# Patient Record
Sex: MALE | Race: WHITE | NOT HISPANIC OR LATINO | Employment: FULL TIME | URBAN - METROPOLITAN AREA
[De-identification: names, ages, dates, MRNs, and addresses within clinical notes are randomized per-mention and may not be internally consistent; named-entity substitution may affect disease eponyms.]

---

## 2017-11-21 ENCOUNTER — HOSPITAL ENCOUNTER (OUTPATIENT)
Facility: HOSPITAL | Age: 57
Setting detail: OBSERVATION
Discharge: HOME | End: 2017-11-22
Attending: EMERGENCY MEDICINE | Admitting: INTERNAL MEDICINE
Payer: COMMERCIAL

## 2017-11-21 ENCOUNTER — APPOINTMENT (EMERGENCY)
Dept: CT IMAGING | Facility: HOSPITAL | Age: 57
End: 2017-11-21
Payer: COMMERCIAL

## 2017-11-21 ENCOUNTER — APPOINTMENT (EMERGENCY)
Dept: RADIOLOGY | Facility: HOSPITAL | Age: 57
End: 2017-11-21
Payer: COMMERCIAL

## 2017-11-21 DIAGNOSIS — R07.9 CHEST PAIN, UNSPECIFIED TYPE: Primary | ICD-10-CM

## 2017-11-21 DIAGNOSIS — R03.0 ELEVATED BLOOD PRESSURE READING: ICD-10-CM

## 2017-11-21 DIAGNOSIS — I71.2 THORACIC AORTIC ANEURYSM WITHOUT RUPTURE (HCC): ICD-10-CM

## 2017-11-21 PROBLEM — R00.1 BRADYCARDIA: Status: ACTIVE | Noted: 2017-11-21

## 2017-11-21 PROBLEM — I72.9 ANEURYSM (HCC): Status: ACTIVE | Noted: 2017-11-21

## 2017-11-21 LAB
ALBUMIN SERPL BCP-MCNC: 3.9 G/DL (ref 3.5–5)
ALP SERPL-CCNC: 50 U/L (ref 46–116)
ALT SERPL W P-5'-P-CCNC: 39 U/L (ref 12–78)
ANION GAP SERPL CALCULATED.3IONS-SCNC: 7 MMOL/L (ref 4–13)
AST SERPL W P-5'-P-CCNC: 27 U/L (ref 5–45)
BASOPHILS # BLD AUTO: 0.05 THOUSANDS/ΜL (ref 0–0.1)
BASOPHILS NFR BLD AUTO: 1 % (ref 0–1)
BILIRUB SERPL-MCNC: 0.3 MG/DL (ref 0.2–1)
BUN SERPL-MCNC: 23 MG/DL (ref 5–25)
CALCIUM SERPL-MCNC: 9.3 MG/DL (ref 8.3–10.1)
CHLORIDE SERPL-SCNC: 103 MMOL/L (ref 100–108)
CO2 SERPL-SCNC: 31 MMOL/L (ref 21–32)
CREAT SERPL-MCNC: 0.96 MG/DL (ref 0.6–1.3)
EOSINOPHIL # BLD AUTO: 0.32 THOUSAND/ΜL (ref 0–0.61)
EOSINOPHIL NFR BLD AUTO: 6 % (ref 0–6)
ERYTHROCYTE [DISTWIDTH] IN BLOOD BY AUTOMATED COUNT: 12.7 % (ref 11.6–15.1)
GFR SERPL CREATININE-BSD FRML MDRD: 88 ML/MIN/1.73SQ M
GLUCOSE SERPL-MCNC: 101 MG/DL (ref 65–140)
HCT VFR BLD AUTO: 42.6 % (ref 36.5–49.3)
HGB BLD-MCNC: 14.2 G/DL (ref 12–17)
LYMPHOCYTES # BLD AUTO: 2.05 THOUSANDS/ΜL (ref 0.6–4.47)
LYMPHOCYTES NFR BLD AUTO: 36 % (ref 14–44)
MAGNESIUM SERPL-MCNC: 2.1 MG/DL (ref 1.6–2.6)
MCH RBC QN AUTO: 31.1 PG (ref 26.8–34.3)
MCHC RBC AUTO-ENTMCNC: 33.3 G/DL (ref 31.4–37.4)
MCV RBC AUTO: 93 FL (ref 82–98)
MONOCYTES # BLD AUTO: 0.46 THOUSAND/ΜL (ref 0.17–1.22)
MONOCYTES NFR BLD AUTO: 8 % (ref 4–12)
NEUTROPHILS # BLD AUTO: 2.77 THOUSANDS/ΜL (ref 1.85–7.62)
NEUTS SEG NFR BLD AUTO: 49 % (ref 43–75)
NRBC BLD AUTO-RTO: 0 /100 WBCS
PLATELET # BLD AUTO: 181 THOUSANDS/UL (ref 149–390)
PMV BLD AUTO: 9.4 FL (ref 8.9–12.7)
POTASSIUM SERPL-SCNC: 4.3 MMOL/L (ref 3.5–5.3)
PROT SERPL-MCNC: 7.3 G/DL (ref 6.4–8.2)
RBC # BLD AUTO: 4.57 MILLION/UL (ref 3.88–5.62)
SODIUM SERPL-SCNC: 141 MMOL/L (ref 136–145)
TROPONIN I SERPL-MCNC: <0.02 NG/ML
TROPONIN I SERPL-MCNC: <0.02 NG/ML
TSH SERPL DL<=0.05 MIU/L-ACNC: 2.72 UIU/ML (ref 0.36–3.74)
WBC # BLD AUTO: 5.66 THOUSAND/UL (ref 4.31–10.16)

## 2017-11-21 PROCEDURE — 71275 CT ANGIOGRAPHY CHEST: CPT

## 2017-11-21 PROCEDURE — 93005 ELECTROCARDIOGRAM TRACING: CPT | Performed by: EMERGENCY MEDICINE

## 2017-11-21 PROCEDURE — 80053 COMPREHEN METABOLIC PANEL: CPT | Performed by: EMERGENCY MEDICINE

## 2017-11-21 PROCEDURE — 84443 ASSAY THYROID STIM HORMONE: CPT | Performed by: NURSE PRACTITIONER

## 2017-11-21 PROCEDURE — 83735 ASSAY OF MAGNESIUM: CPT | Performed by: EMERGENCY MEDICINE

## 2017-11-21 PROCEDURE — 74175 CTA ABDOMEN W/CONTRAST: CPT

## 2017-11-21 PROCEDURE — 84484 ASSAY OF TROPONIN QUANT: CPT | Performed by: EMERGENCY MEDICINE

## 2017-11-21 PROCEDURE — 36415 COLL VENOUS BLD VENIPUNCTURE: CPT | Performed by: EMERGENCY MEDICINE

## 2017-11-21 PROCEDURE — 85025 COMPLETE CBC W/AUTO DIFF WBC: CPT | Performed by: EMERGENCY MEDICINE

## 2017-11-21 PROCEDURE — 71020 HB CHEST X-RAY 2VW FRONTAL&LATL: CPT

## 2017-11-21 RX ORDER — ASPIRIN 81 MG/1
324 TABLET, CHEWABLE ORAL ONCE
Status: COMPLETED | OUTPATIENT
Start: 2017-11-21 | End: 2017-11-21

## 2017-11-21 RX ORDER — 0.9 % SODIUM CHLORIDE 0.9 %
3 VIAL (ML) INJECTION AS NEEDED
Status: DISCONTINUED | OUTPATIENT
Start: 2017-11-21 | End: 2017-11-22 | Stop reason: HOSPADM

## 2017-11-21 RX ADMIN — ASPIRIN 81 MG 324 MG: 81 TABLET ORAL at 16:24

## 2017-11-21 RX ADMIN — IOHEXOL 100 ML: 350 INJECTION, SOLUTION INTRAVENOUS at 19:36

## 2017-11-21 NOTE — ED PROVIDER NOTES
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   Had sudden onset of chest pressure and heaviness to the substernal left chest radiating into his left shoulder and "achiness throughout his left arm and in his back but not radiating into his back "  He states he broke out into a diffuse sweat  Patient states that the school nurse came in and told him he looked "pale and clammy"  Patient at the time did have vital signs checked by the school nurse and was noted to be hypertensive  Patient's blood pressure per patient was 140/100  Patient did not take any medication and came to the ER  He states that he has had this several times and was last evaluated several years ago with a stress test   Patient does have a family history where his father had an early MI          History provided by:  Patient   used: No    Chest Pain   Pain location:  Substernal area and L chest  Pain quality: aching, dull, pressure and radiating    Pain radiates to:  L shoulder  Pain radiates to the back: no    Pain severity:  Moderate  Onset quality:  Gradual  Timing:  Constant  Progression:  Partially resolved  Chronicity:  New  Context: at rest    Context: not breathing, no drug use, not lifting, no movement, not raising an arm, no stress and no trauma    Relieved by:  None tried  Worsened by:  Nothing tried  Ineffective treatments:  None tried  Associated symptoms: diaphoresis, heartburn and near-syncope    Associated symptoms: no abdominal pain, no AICD problem, no anxiety, no claudication, no cough, no dizziness, no fever, no lower extremity edema, no nausea, no numbness, no palpitations, no shortness of breath, no syncope, not vomiting and no weakness    Risk factors: male sex    Risk factors: no aortic disease, no coronary artery disease, no diabetes mellitus, no high cholesterol, no hypertension, no prior DVT/PE and no smoking        None       History reviewed  No pertinent past medical history  Past Surgical History:   Procedure Laterality Date    APPENDECTOMY      BACK SURGERY         History reviewed  No pertinent family history  I have reviewed and agree with the history as documented  Social History   Substance Use Topics    Smoking status: Former Smoker    Smokeless tobacco: Never Used    Alcohol use No        Review of Systems   Constitutional: Positive for diaphoresis  Negative for chills and fever  Respiratory: Negative for cough and shortness of breath  Cardiovascular: Positive for chest pain and near-syncope  Negative for palpitations, claudication, leg swelling and syncope  Gastrointestinal: Positive for heartburn  Negative for abdominal pain, nausea and vomiting  Neurological: Negative for dizziness, weakness and numbness  Physical Exam  ED Triage Vitals [11/21/17 1559]   Temperature Pulse Respirations Blood Pressure SpO2   98 3 °F (36 8 °C) 55 17 161/93 96 %      Temp Source Heart Rate Source Patient Position - Orthostatic VS BP Location FiO2 (%)   Oral Monitor Sitting Right arm --      Pain Score       2           Orthostatic Vital Signs  Vitals:    11/21/17 1559 11/21/17 1625 11/21/17 1830 11/21/17 1900   BP: 161/93 (!) 178/79  150/77   Pulse: 55  64 (!) 49   Patient Position - Orthostatic VS: Sitting Sitting         Physical Exam   Constitutional: He is oriented to person, place, and time  He appears well-developed and well-nourished  No distress  HENT:   Head: Normocephalic and atraumatic     Mouth/Throat: Oropharynx is clear and moist    Eyes: Conjunctivae and EOM are normal  Pupils are equal, round, and reactive to light  Neck: Normal range of motion  Neck supple  Cardiovascular: Regular rhythm, normal heart sounds and intact distal pulses  Bradycardia present  No murmur heard  Pulmonary/Chest: Effort normal and breath sounds normal  No stridor  No respiratory distress  Abdominal: Soft  Bowel sounds are normal  He exhibits no distension  There is no tenderness  Musculoskeletal: Normal range of motion  He exhibits no edema  Neurological: He is alert and oriented to person, place, and time  No cranial nerve deficit  Skin: Skin is warm  Capillary refill takes less than 2 seconds  He is not diaphoretic  Nursing note and vitals reviewed  ED Medications  Medications   sodium chloride (PF) 0 9 % injection 3 mL (not administered)   aspirin chewable tablet 324 mg (324 mg Oral Given 11/21/17 1624)   iohexol (OMNIPAQUE) 350 MG/ML injection (MULTI-DOSE) 100 mL (100 mL Intravenous Given 11/21/17 1936)       Diagnostic Studies  Results Reviewed     Procedure Component Value Units Date/Time    Troponin I [[id number]]  (Normal) Collected:  11/21/17 1942    Lab Status:  Final result Specimen:  Blood from Arm, Right Updated:  11/21/17 2008     Troponin I <0 02 ng/mL     Narrative:         [employer] Chemistry analyzer 99% cutoff is > 0 04 ng/mL in network labs    o cTnI 99% cutoff is useful only when applied to patients in the clinical setting of myocardial ischemia  o cTnI 99% cutoff should be interpreted in the context of clinical history, ECG findings and possibly cardiac imaging to establish correct diagnosis  o cTnI 99% cutoff may be suggestive but clearly not indicative of a coronary event without the clinical setting of myocardial ischemia      Troponin I [[id number]]  (Normal) Collected:  11/21/17 1631    Lab Status:  Final result Specimen:  Blood from Arm, Right Updated:  11/21/17 1657     Troponin I <0 02 ng/mL     Narrative:         [employer] Chemistry analyzer 99% cutoff is > 0 04 ng/mL in network labs    o cTnI 99% cutoff is useful only when applied to patients in the clinical setting of myocardial ischemia  o cTnI 99% cutoff should be interpreted in the context of clinical history, ECG findings and possibly cardiac imaging to establish correct diagnosis  o cTnI 99% cutoff may be suggestive but clearly not indicative of a coronary event without the clinical setting of myocardial ischemia  Comprehensive metabolic panel [[id number]] Collected:  11/21/17 1631    Lab Status:  Final result Specimen:  Blood from Arm, Right Updated:  11/21/17 1654     Sodium 141 mmol/L      Potassium 4 3 mmol/L      Chloride 103 mmol/L      CO2 31 mmol/L      Anion Gap 7 mmol/L      BUN 23 mg/dL      Creatinine 0 96 mg/dL      Glucose 101 mg/dL      Calcium 9 3 mg/dL      AST 27 U/L      ALT 39 U/L      Alkaline Phosphatase 50 U/L      Total Protein 7 3 g/dL      Albumin 3 9 g/dL      Total Bilirubin 0 30 mg/dL      eGFR 88 ml/min/1 73sq m     Narrative:         National Kidney Disease Education Program recommendations are as follows:  GFR calculation is accurate only with a steady state creatinine  Chronic Kidney disease less than 60 ml/min/1 73 sq  meters  Kidney failure less than 15 ml/min/1 73 sq  meters      Magnesium [[id number]]  (Normal) Collected:  11/21/17 1631    Lab Status:  Final result Specimen:  Blood from Arm, Right Updated:  11/21/17 1654     Magnesium 2 1 mg/dL     CBC and differential [[id number]]  (Normal) Collected:  11/21/17 1631    Lab Status:  Final result Specimen:  Blood from Arm, Right Updated:  11/21/17 1638     WBC 5 66 Thousand/uL      RBC 4 57 Million/uL      Hemoglobin 14 2 g/dL      Hematocrit 42 6 %      MCV 93 fL      MCH 31 1 pg      MCHC 33 3 g/dL      RDW 12 7 %      MPV 9 4 fL      Platelets 264 Thousands/uL      nRBC 0 /100 WBCs      Neutrophils Relative 49 %      Lymphocytes Relative 36 %      Monocytes Relative 8 %      Eosinophils Relative 6 %      Basophils Relative 1 %      Neutrophils Absolute 2 77 Thousands/µL      Lymphocytes Absolute 2 05 Thousands/µL      Monocytes Absolute 0 46 Thousand/µL      Eosinophils Absolute 0 32 Thousand/µL      Basophils Absolute 0 05 Thousands/µL                  CTA dissection protocol chest and abdomen   Final Result by Saúl Bermudez MD (11/21 1949)      4 2 cm ascending thoracic aortic aneurysm  No evidence of an aortic dissection  No infiltrate or pleural effusion  No acute intra-abdominal abnormality  No free air or free fluid  Workstation performed: [id number]         X-ray chest 2 views   Final Result by Mary Garcia MD (11/21 1648)      No active pulmonary disease  Workstation performed: [id number]                    Procedures  Procedures       Phone Contacts  ED Phone Contact    ED Course  ED Course as of Nov 21 2021   Tue Nov 21, 2017   1712 Long discussion with patient and wife regarding heart score and risk stratification  He is aware of risks of not staying for complete cardiac workup  Offered patient admission  However patient wishes to go home  He is willing for a 2 troponin rule out  36 Pt is bradycardic and asymptomatic  Patient states that he does run several miles every day  He is aware of a slow heart rate  1859 Patient remained chest pain free  It is noted his blood pressure remains elevated  He states that this is not his normal  Patient did have mild interscapular discomfort on initial exam   We will CT rule out aortic pathology    Patient is aware of this    1947 EKG INTERPRETATION  RHYTHM:  Normal sinus rhythm at 60 beats per minute  AXIS:  Normal axis  INTERVALS:  1st degree AV block  QRS COMPLEX:  Within normal limits  ST SEGMENT:  Nonspecific ST segment changes  QT INTERVAL:  QTC measured at 428 millisecond  COMPARED WITH PRIOR compared to EKG today new first-degree AV block  Interpretation by Jacqui Steele DO      Pending 2nd troponin as well as CAT scan results  2012 CT scan reveals    2012 CT scan reveals ascending aortic aneurysm without dissection  In discussion with patient and wife regarding this  Patient with persistent elevated blood pressures here in the ER and is not on any medication  Patient states that he has had episodes of intermittent tearing chest pain in the past   Patient remains chest pain-free at this time  He is agreeable for admission            HEART Risk Score    Flowsheet Row Most Recent Value   History  1 Filed at: 11/21/2017 1711   ECG  1 Filed at: 11/21/2017 1711   Age  1 Filed at: 11/21/2017 1711   Risk Factors  1 Filed at: 11/21/2017 1711   Troponin  0 Filed at: 11/21/2017 1711   Heart Score Risk Calculator   History  1 Filed at: 11/21/2017 1711   ECG  1 Filed at: 11/21/2017 1711   Age  1 Filed at: 11/21/2017 1711   Risk Factors  1 Filed at: 11/21/2017 1711   Troponin  0 Filed at: 11/21/2017 1711   HEART Score  4 Filed at: 11/21/2017 1711   HEART Score  4 Filed at: 11/21/2017 1711                    CLAUDIA Risk Score    Flowsheet Row Most Recent Value   Age >= 72  0 Filed at: 11/21/2017 1705   Known CAD (stenosis >= 50%)  0 Filed at: 11/21/2017 1705   Recent (<=24 hrs) Service Angina  1 Filed at: 11/21/2017 1705   ST Deviation >= 0 5 mm  0 Filed at: 11/21/2017 1705   3+ CAD Risk Factors (FHx, HTN, HLP, DM, Smoker)  0 Filed at: 11/21/2017 1705   Aspirin Use Past 7 Days  0 Filed at: 11/21/2017 1705   Elevated Cardiac Markers  0 Filed at: 11/21/2017 1705   CLAUDIA Risk Score (Calculated)  1 Filed at: 11/21/2017 1705        Wells' Criteria for PE    Flowsheet Row Most Recent Value   Wells' Criteria for PE   Clinical signs and symptoms of DVT  0 Filed at: 11/21/2017 1705   PE is primary diagnosis or equally likely  0 Filed at: 11/21/2017 1705   HR >100  0 Filed at: 11/21/2017 1705   Immobilization at least 3 days or Surgery in the previous 4 weeks  0 Filed at: 11/21/2017 1705   Previous, objectively diagnosed PE or DVT  0 Filed at: 11/21/2017 1705   Hemoptysis  0 Filed at: 11/21/2017 1705   Malignancy with treatment within 6 months or palliative  0 Filed at: 11/21/2017 1705   Wells' Criteria Total  0 Filed at: 11/21/2017 1705            [facility]  Number of Diagnoses or Management Options  Diagnosis management comments: EKG INTERPRETATION 1601  RHYTHM: 50's   AXIS:  Normal  INTERVALS:  Within normal limits  QRS COMPLEX:  Within normal limits  ST SEGMENT:  Nonspecific ST segment changes  QT INTERVAL:  QTC measured at 395 milliseconds  COMPARED WITH PRIOR compared to May of 2012 no significant change  Interpretation by Rubens Wagner DO         Amount and/or Complexity of Data Reviewed  Clinical lab tests: ordered  Tests in the radiology section of CPT®: ordered  Tests in the medicine section of CPT®: ordered      CritCare Time    Disposition  Final diagnoses:   Chest pain, unspecified type   Thoracic aortic aneurysm without rupture ([facility] [address] )   Elevated blood pressure reading     Time reflects when diagnosis was documented in both MDM as applicable and the Disposition within this note     Time User Action Codes Description Comment    11/21/2017  4:15 PM Bendock, Teofilo Abu L Add [R07 9] Chest pain, unspecified type     11/21/2017  8:18 PM Bendock, Teofilo Abu L Add [I71 2] Thoracic aortic aneurysm without rupture ([facility] [address] )     11/21/2017  8:21 PM Bendock, Teofilo Abu L Add [R03 0] Elevated blood pressure reading       ED Disposition     ED Disposition Condition Comment    Admit  Case was discussed with Todd Cuello and the patient's admission status was agreed to be Admission Status: observation status to the service of Dr Fisher Board          Follow-up Information    None       Patient's Medications    No medications on file     No discharge procedures on file      ED Provider  Electronically Signed by           Mayra Patiño DO  11/21/17 0375

## 2017-11-21 NOTE — Clinical Note
Case was discussed with Johnathon Vazquez and the patient's admission status was agreed to be Admission Status: observation status to the service of Dr Deann Burrell

## 2017-11-22 ENCOUNTER — APPOINTMENT (OUTPATIENT)
Dept: NUCLEAR MEDICINE | Facility: HOSPITAL | Age: 57
End: 2017-11-22
Payer: COMMERCIAL

## 2017-11-22 ENCOUNTER — GENERIC CONVERSION - ENCOUNTER (OUTPATIENT)
Dept: OTHER | Facility: OTHER | Age: 57
End: 2017-11-22

## 2017-11-22 ENCOUNTER — APPOINTMENT (OUTPATIENT)
Dept: NON INVASIVE DIAGNOSTICS | Facility: HOSPITAL | Age: 57
End: 2017-11-22
Payer: COMMERCIAL

## 2017-11-22 VITALS
HEART RATE: 56 BPM | OXYGEN SATURATION: 99 % | WEIGHT: 191.58 LBS | DIASTOLIC BLOOD PRESSURE: 77 MMHG | RESPIRATION RATE: 16 BRPM | SYSTOLIC BLOOD PRESSURE: 131 MMHG | TEMPERATURE: 97.6 F

## 2017-11-22 PROBLEM — I71.2 ANEURYSM OF ASCENDING AORTA (HCC): Status: ACTIVE | Noted: 2017-11-21

## 2017-11-22 LAB
ANION GAP SERPL CALCULATED.3IONS-SCNC: 8 MMOL/L (ref 4–13)
ATRIAL RATE: 56 BPM
ATRIAL RATE: 60 BPM
BUN SERPL-MCNC: 18 MG/DL (ref 5–25)
CALCIUM SERPL-MCNC: 9 MG/DL (ref 8.3–10.1)
CHLORIDE SERPL-SCNC: 105 MMOL/L (ref 100–108)
CHOLEST SERPL-MCNC: 206 MG/DL (ref 50–200)
CO2 SERPL-SCNC: 29 MMOL/L (ref 21–32)
CREAT SERPL-MCNC: 1.02 MG/DL (ref 0.6–1.3)
ERYTHROCYTE [DISTWIDTH] IN BLOOD BY AUTOMATED COUNT: 12.9 % (ref 11.6–15.1)
EST. AVERAGE GLUCOSE BLD GHB EST-MCNC: 111 MG/DL
GFR SERPL CREATININE-BSD FRML MDRD: 82 ML/MIN/1.73SQ M
GLUCOSE SERPL-MCNC: 95 MG/DL (ref 65–140)
HBA1C MFR BLD: 5.5 % (ref 4.2–6.3)
HCT VFR BLD AUTO: 43.2 % (ref 36.5–49.3)
HDLC SERPL-MCNC: 42 MG/DL (ref 40–60)
HGB BLD-MCNC: 14.3 G/DL (ref 12–17)
LDLC SERPL CALC-MCNC: 138 MG/DL (ref 0–100)
MAGNESIUM SERPL-MCNC: 2 MG/DL (ref 1.6–2.6)
MCH RBC QN AUTO: 31 PG (ref 26.8–34.3)
MCHC RBC AUTO-ENTMCNC: 33.1 G/DL (ref 31.4–37.4)
MCV RBC AUTO: 94 FL (ref 82–98)
P AXIS: 60 DEGREES
P AXIS: 62 DEGREES
PLATELET # BLD AUTO: 170 THOUSANDS/UL (ref 149–390)
PLATELET # BLD AUTO: 177 THOUSANDS/UL (ref 149–390)
PMV BLD AUTO: 9.4 FL (ref 8.9–12.7)
PMV BLD AUTO: 9.6 FL (ref 8.9–12.7)
POTASSIUM SERPL-SCNC: 4.5 MMOL/L (ref 3.5–5.3)
PR INTERVAL: 186 MS
PR INTERVAL: 204 MS
QRS AXIS: 71 DEGREES
QRS AXIS: 77 DEGREES
QRSD INTERVAL: 86 MS
QRSD INTERVAL: 86 MS
QT INTERVAL: 410 MS
QT INTERVAL: 428 MS
QTC INTERVAL: 395 MS
QTC INTERVAL: 428 MS
RBC # BLD AUTO: 4.62 MILLION/UL (ref 3.88–5.62)
SODIUM SERPL-SCNC: 142 MMOL/L (ref 136–145)
T WAVE AXIS: 40 DEGREES
T WAVE AXIS: 43 DEGREES
TRIGL SERPL-MCNC: 131 MG/DL
VENTRICULAR RATE: 56 BPM
VENTRICULAR RATE: 60 BPM
WBC # BLD AUTO: 4.99 THOUSAND/UL (ref 4.31–10.16)

## 2017-11-22 PROCEDURE — 93017 CV STRESS TEST TRACING ONLY: CPT

## 2017-11-22 PROCEDURE — 80061 LIPID PANEL: CPT | Performed by: NURSE PRACTITIONER

## 2017-11-22 PROCEDURE — A9502 TC99M TETROFOSMIN: HCPCS

## 2017-11-22 PROCEDURE — 93306 TTE W/DOPPLER COMPLETE: CPT

## 2017-11-22 PROCEDURE — 99285 EMERGENCY DEPT VISIT HI MDM: CPT

## 2017-11-22 PROCEDURE — 78452 HT MUSCLE IMAGE SPECT MULT: CPT

## 2017-11-22 PROCEDURE — 85049 AUTOMATED PLATELET COUNT: CPT | Performed by: NURSE PRACTITIONER

## 2017-11-22 PROCEDURE — 36415 COLL VENOUS BLD VENIPUNCTURE: CPT | Performed by: NURSE PRACTITIONER

## 2017-11-22 PROCEDURE — 83036 HEMOGLOBIN GLYCOSYLATED A1C: CPT | Performed by: FAMILY MEDICINE

## 2017-11-22 PROCEDURE — 80048 BASIC METABOLIC PNL TOTAL CA: CPT | Performed by: NURSE PRACTITIONER

## 2017-11-22 PROCEDURE — 83735 ASSAY OF MAGNESIUM: CPT | Performed by: NURSE PRACTITIONER

## 2017-11-22 PROCEDURE — 85027 COMPLETE CBC AUTOMATED: CPT | Performed by: NURSE PRACTITIONER

## 2017-11-22 RX ORDER — AMLODIPINE BESYLATE 5 MG/1
5 TABLET ORAL DAILY
Qty: 30 TABLET | Refills: 0 | Status: SHIPPED | OUTPATIENT
Start: 2017-11-23 | End: 2017-11-22

## 2017-11-22 RX ORDER — ACETAMINOPHEN 325 MG/1
650 TABLET ORAL EVERY 4 HOURS PRN
Status: DISCONTINUED | OUTPATIENT
Start: 2017-11-22 | End: 2017-11-22

## 2017-11-22 RX ORDER — AMLODIPINE BESYLATE 5 MG/1
5 TABLET ORAL DAILY
Qty: 30 TABLET | Refills: 0 | Status: SHIPPED | OUTPATIENT
Start: 2017-11-23 | End: 2018-03-29 | Stop reason: ALTCHOICE

## 2017-11-22 RX ORDER — ACETAMINOPHEN 325 MG/1
650 TABLET ORAL EVERY 6 HOURS PRN
Status: DISCONTINUED | OUTPATIENT
Start: 2017-11-22 | End: 2017-11-22 | Stop reason: HOSPADM

## 2017-11-22 RX ORDER — ASPIRIN 81 MG/1
81 TABLET, CHEWABLE ORAL DAILY
Qty: 30 TABLET | Refills: 0 | Status: SHIPPED | OUTPATIENT
Start: 2017-11-23

## 2017-11-22 RX ORDER — ATORVASTATIN CALCIUM 40 MG/1
40 TABLET, FILM COATED ORAL EVERY EVENING
Qty: 30 TABLET | Refills: 0 | Status: SHIPPED | OUTPATIENT
Start: 2017-11-22 | End: 2017-11-22

## 2017-11-22 RX ORDER — ASPIRIN 81 MG/1
81 TABLET, CHEWABLE ORAL DAILY
Status: DISCONTINUED | OUTPATIENT
Start: 2017-11-23 | End: 2017-11-22 | Stop reason: HOSPADM

## 2017-11-22 RX ORDER — ATORVASTATIN CALCIUM 40 MG/1
40 TABLET, FILM COATED ORAL EVERY EVENING
Status: DISCONTINUED | OUTPATIENT
Start: 2017-11-22 | End: 2017-11-22 | Stop reason: HOSPADM

## 2017-11-22 RX ORDER — AMLODIPINE BESYLATE 5 MG/1
5 TABLET ORAL DAILY
Status: DISCONTINUED | OUTPATIENT
Start: 2017-11-22 | End: 2017-11-22 | Stop reason: HOSPADM

## 2017-11-22 RX ORDER — ATORVASTATIN CALCIUM 40 MG/1
40 TABLET, FILM COATED ORAL EVERY EVENING
Qty: 30 TABLET | Refills: 0 | Status: SHIPPED | OUTPATIENT
Start: 2017-11-22 | End: 2018-03-29 | Stop reason: ALTCHOICE

## 2017-11-22 RX ORDER — ASPIRIN 81 MG/1
81 TABLET, CHEWABLE ORAL DAILY
Qty: 30 TABLET | Refills: 0 | Status: SHIPPED | OUTPATIENT
Start: 2017-11-23 | End: 2017-11-22

## 2017-11-22 RX ADMIN — AMLODIPINE BESYLATE 5 MG: 5 TABLET ORAL at 09:42

## 2017-11-22 NOTE — CONSULTS
Consult Note - Vascular Surgery   [facility]: [phone]    Assessment:  Chest pain - negative troponins, EKG with out notable abnormalities, with CTA negative for dissection however incidental finding of ascending thoracic aorta with aneurysmal dilation of 4 2 cm  Patient did have elevated blood pressure on admission  Cardiology is following  A stress test showed no chest pain during stress and stress EKG was negative for ischemia  Ejection fraction 55%  Ascending thoracic aortic aneurysm - incidental finding on CT scan and demonstrates aneurysmal dilation of the ascending thoracic aorta measuring 4 2 cm  No evidence of dissection    Plan:  - no need for vascular surgery intervention of ascending thoracic aortic dilation  - we will referred to cardiothoracic surgery for outpatient follow-up  I have contacted CT surgery and they will contact the patient about making appointment  I have also provided the phone number for the patient  This was discussed with the patient and his wife     ______________________________________________________________________    Consulting Service: [facility]    Chief Complaint:  Had sudden sharp chest pain on the left side of my chest that went to my shoulder I also felt sweaty and sick  It scared me    HPI: Bradley Vargas is a 65 y o  male who presents with sudden onset of left-sided chest pain yesterday while at work  Patient is a  in states he has had episodes of chest pain in the past, but this was much more severe and he had not had the associated sweating and radiation into the shoulder in the past   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 and his blood pressure was 140/100  He was also told by the school nurse that he looked pale  Patient states he did has an elevated blood pressure few years ago with a stress test  Patient states he is active in each generally well  He denies any smoking or tobacco use    He does note that his job is stressful and he me have a caffeine problem "  He denies chest pain with physical activity  He states that he thinks his father had congestive heart failure  He states his father was a smoker also  He denies any other known cardiac family history  Patient denies current chest pain, nausea, vomiting, shortness of breath, palpitations, headache, lightheadedness, tingling or numbness in extremities  Review of Systems:  Negative except as noted in HPI     Past Medical History:  Past Medical History:   Diagnosis Date    Bradycardia with 41-50 beats per minute     Hypertension     Thoracic aortic aneurysm without rupture ([facility] [address] )        Past Surgical History:  Past Surgical History:   Procedure Laterality Date    APPENDECTOMY      BACK SURGERY         Social History:  History   Alcohol Use No     History   Drug Use No     History   Smoking Status    Former Smoker   Smokeless Tobacco    Never Used       Family History:  History reviewed  No pertinent family history  Allergies:  No Known Allergies    Medications:  Current Facility-Administered Medications   Medication Dose Route Frequency    acetaminophen (TYLENOL) tablet 650 mg  650 mg Oral Q6H PRN    amLODIPine (NORVASC) tablet 5 mg  5 mg Oral Daily    [START ON 11/23/2017] aspirin chewable tablet 81 mg  81 mg Oral Daily    atorvastatin (LIPITOR) tablet 40 mg  40 mg Oral QPM    enoxaparin (LOVENOX) subcutaneous injection 40 mg  40 mg Subcutaneous Daily    sodium chloride (PF) 0 9 % injection 3 mL  3 mL Intravenous PRN       Vitals:  /77   Pulse 56   Temp 97 6 °F (36 4 °C) (Oral)   Resp 16   Wt 86 9 kg (191 lb 9 3 oz)   SpO2 99%     I/Os:  No intake/output data recorded  No intake/output data recorded      Lab Results and Cultures:   CBC with diff:   Lab Results   Component Value Date    WBC 4 99 11/22/2017    HGB 14 3 11/22/2017    HCT 43 2 11/22/2017    MCV 94 11/22/2017     11/22/2017    MCH 31 0 11/22/2017    MCHC 33 1 11/22/2017    RDW 12 9 11/22/2017    MPV 9 4 11/22/2017    NRBC 0 11/21/2017   ,   BMP/CMP:  Lab Results   Component Value Date     11/22/2017    K 4 5 11/22/2017     11/22/2017    CO2 29 11/22/2017    ANIONGAP 8 11/22/2017    BUN 18 11/22/2017    CREATININE 1 02 11/22/2017    GLUCOSE 95 11/22/2017    CALCIUM 9 0 11/22/2017    AST 27 11/21/2017    ALT 39 11/21/2017    ALKPHOS 50 11/21/2017    PROT 7 3 11/21/2017    ALBUMIN 3 9 11/21/2017    BILITOT 0 30 11/21/2017    EGFR 82 11/22/2017   ,   Lipid Panel:   Lab Results   Component Value Date    CHOL 206 (H) 11/22/2017   ,   Imaging:  X-ray Chest 2 Views    Result Date: 11/21/2017  Impression: No active pulmonary disease  Workstation performed: [id number]     Cta Dissection Protocol Chest And Abdomen    Result Date: 11/21/2017  Impression: 4 2 cm ascending thoracic aortic aneurysm  No evidence of an aortic dissection  No infiltrate or pleural effusion  No acute intra-abdominal abnormality  No free air or free fluid  Workstation performed: [id number]       Physical Exam:    General appearance: alert, appears stated age and cooperative  Head: Normocephalic, without obvious abnormality, atraumatic  Eyes: conjunctivae/corneas clear  PERRL, EOM's intact  Fundi benign    Neck: no adenopathy, no carotid bruit, no JVD and supple, symmetrical, trachea midline   Lungs: clear to auscultation bilaterally  Chest wall: no tenderness  Heart: regular rate and rhythm, S1, S2 normal, no murmur, click, rub or gallop  Abdomen: soft, non-tender; bowel sounds normal; no masses,  no organomegaly  Extremities: extremities normal, atraumatic, no cyanosis or edema  Skin: Skin color, texture, turgor normal  No rashes or lesions  Neurologic: Grossly normal    Pulse exam:  Radial: Right: 2+ Left[de-identified] 2+  Ulnar: Right: 2+ Left[de-identified] 2+  Femoral: Right: 2+ Left: 2+  DP: Right: 2+ Left: 2+  PT: Right: 2+ Left: 2+    SP ChristensenC  11/22/2017

## 2017-11-22 NOTE — CASE MANAGEMENT
[facility] in the [facility] by [address] for 2017  Network Utilization Review Department  Phone: [phone]; Fax [phone]  ATTENTION: The Network Utilization Review Department is now centralized for our 7 Facilities  Make a note that we have a new phone and fax numbers for our Department  Please call with any questions or concerns to [phone] and carefully follow the prompts so that you are directed to the right person  All voicemails are confidential  Fax any determinations, approvals, denials, and requests for initial or continue stay review clinical to [phone]  Due to HIGH CALL volume, it would be easier if you could please send faxed requests to expedite your requests and in part, help us provide discharge notifications faster  Initial Clinical Review    Admission: Date/Time/Statement: 11/21 2021     Orders Placed This Encounter   Procedures    Place in Observation (expected length of stay for this patient is less than two midnights)     Standing Status:   Standing     Number of Occurrences:   1     Order Specific Question:   Admitting Physician     Answer:   Donna William [[id number]]     Order Specific Question:   Level of Care     Answer:   Med Surg [16]         ED: Date/Time/Mode of Arrival:   ED Arrival Information     Expected Arrival Acuity Means of Arrival Escorted By Service Admission Type    - 11/21/2017 15:50 Urgent Walk-In Spouse General Medicine Urgent    Arrival Complaint    CHEST PAIN          Chief Complaint:   Chief Complaint   Patient presents with    Chest Pain     pt c/o chest pain that started at approx 1500 this afternoon  denies any SOB but states "it just feels tight" denies any hx of asthma or lung issues       History of Illness:    Buddy Mckenzie is a 65 y o  male who presents with left chest pain radiating to left shoulder, left arm, left scapular and jaw    Associated with diffuse diaphoresis and lightheadedness  Patient report onset about 1 month ago  With worsening of symptoms today  Patient report highly stressful work environment  He denies shortness of breath, nausea, vomiting, fever chills  ED Vital Signs:   ED Triage Vitals [11/21/17 1559]   Temperature Pulse Respirations Blood Pressure SpO2   98 3 °F (36 8 °C) 55 17 161/93 96 %      Temp Source Heart Rate Source Patient Position - Orthostatic VS BP Location FiO2 (%)   Oral Monitor Sitting Right arm --      Pain Score       2        Wt Readings from Last 1 Encounters:   11/21/17 86 9 kg (191 lb 9 3 oz)       Vital Signs (abnormal): wnl   Abnormal Labs/Diagnostic Test Results: CT chest- wnl   CXR -wnl   ED Treatment:   Medication Administration from 11/21/2017 1550 to 11/22/2017 9190       Date/Time Order Dose Route Action Action by Comments     11/21/2017 1624 aspirin chewable tablet 324 mg 324 mg Oral Given Ewa Mcmillan RN      11/21/2017 1936 iohexol (OMNIPAQUE) 350 MG/ML injection (MULTI-DOSE) 100 mL 100 mL Intravenous Given Phil Mackenzie           Past Medical/Surgical History: Active Ambulatory Problems     Diagnosis Date Noted    No Active Ambulatory Problems     Resolved Ambulatory Problems     Diagnosis Date Noted    No Resolved Ambulatory Problems     Past Medical History:   Diagnosis Date    Bradycardia with 41-50 beats per minute     Hypertension     Thoracic aortic aneurysm without rupture [facility])        Admitting Diagnosis: Chest pain [R07 9]    Age/Sex: 65 y o  male    Assessment/Plan: Hospital Problem List:      Principal Problem:    Chest pain  Active Problems:    Bradycardia    Elevated blood pressure, situational    Aneurysm (HCC)   Plan for the Primary Problem(s):  · Chest pain:  Possible ACS  Persistent, 2/10 pain scale   "like a muscle injury" Symptoms typical   Left chest radiating to left shoulder left arm, jaw and left scapular    CTA reviewed negative for acute PE, note 4 2 cm ascending thoracic aortic aneurysm  No evidence of an aortic dissection  ? EKG and troponin reviewed unremarkable  Will trend  ? Cardiology consult  ? Telemetry  ? Monitor for risk factors, lipid panel and hemoglobin A1c   ? Echo  ? Will initiate Norvasc, low-dose Lipitor, aspirin    Plan for Additional Problems:    · Bradycardia:  Asymptomatic  Pt report hx triathlon, cyclist, runner, athlete related bradycardia  Continue telemetry, cardiology consult  Continue to monitor    Elevated blood pressure likely situational:  Patient denies history of, Will initiate Norvasc 5 mg     Incidental finding:  Aneurysm:  New onset:  Noted from CTA 4 2 cm ascending thoracic aortic aneurysm  No evidence of an aortic dissection  Will consult vascular for further evaluation and management    VTE Prophylaxis: Enoxaparin (Lovenox)  / sequential compression device   Code Status: Full code  POLST: There is no POLST form on file for this patient (pre-hospital)   Anticipated Length of Stay:  Patient will be admitted on an Observation basis with an anticipated length of stay of  less than 2 midnights     Justification for Hospital Stay:  Rule out ACS, Cardiology workup       Admission Orders:  Scheduled Meds:   amLODIPine 5 mg Oral Daily   [START ON 11/23/2017] aspirin 81 mg Oral Daily   atorvastatin 40 mg Oral QPM   enoxaparin 40 mg Subcutaneous Daily     Continuous Infusions:    PRN Meds:   acetaminophen    Insert peripheral IV **AND** sodium chloride (PF)    Cardiology consult   Up and OOB as german   Cardiac diet   Tele   EKG prn cp   Serial trop   11/22 lipid profile , hgb a1c , cbc , bmp, mg   Consult vascular surgery

## 2017-11-22 NOTE — SUBJECTIVE & OBJECTIVE
VTE Pharmacologic Prophylaxis:   Pharmacologic: Enoxaparin (Lovenox)  Mechanical: Mechanical VTE prophylaxis in place  Patient Centered Rounds: I have performed bedside rounds with nursing staff today  Discussions with Specialists or Other Care Team Provider: n/a  Education and Discussions with Family / Patient: patient  Time Spent for Care: 15 minutes  More than 50% of total time spent on counseling and coordination of care as described above  Current Length of Stay: 0 day(s)  Current Patient Status: Observation   Certification Statement: Patient admitted for observation last night for chest pain rule out, will remain in observation pending Cardiology recommendations    Discharge Plan:  Home when cleared by Cardiology  Code Status: Level 1 - Full Code    Subjective:   Overnight no events, troponin trend negative    Objective:   Vitals:   Temp (24hrs), Av °F (36 7 °C), Min:97 6 °F (36 4 °C), Max:98 3 °F (36 8 °C)    HR:  [49-64] 61  Resp:  [16-18] 16  BP: (120-178)/(69-93) 132/76  SpO2:  [96 %-100 %] 100 %  There is no height or weight on file to calculate BMI       Input and Output Summary (last 24 hours):     No intake or output data in the 24 hours ending 17 0831    Physical Exam:     Physical Exam    Additional Data:   Labs:    Results from last 7 days  Lab Units 17  1631   WBC Thousand/uL 4 99 5 66   HEMOGLOBIN g/dL 14 3 14 2   HEMATOCRIT % 43 2 42 6   PLATELETS Thousands/uL 170 181   NEUTROS PCT %  --  49   LYMPHS PCT %  --  36   MONOS PCT %  --  8   EOS PCT %  --  6       Results from last 7 days  Lab Units 17  1631   SODIUM mmol/L 142 141   POTASSIUM mmol/L 4 5 4 3   CHLORIDE mmol/L 105 103   CO2 mmol/L 29 31   BUN mg/dL 18 23   CREATININE mg/dL 1 02 0 96   CALCIUM mg/dL 9 0 9 3   TOTAL PROTEIN g/dL  --  7 3   BILIRUBIN TOTAL mg/dL  --  0 30   ALK PHOS U/L  --  50   ALT U/L  --  39   AST U/L  --  27   GLUCOSE RANDOM mg/dL 95 101           * I Have Reviewed All Lab Data Listed Above  * Additional Pertinent Lab Tests Reviewed: [address] [address] Admission Reviewed    Imaging:    Imaging Reports Reviewed Today Include:  Chest CTA dissection    Cultures:   Blood Culture: No results found for: BLOODCX  Urine Culture: No results found for: URINECX  Sputum Culture: No components found for: SPUTUMCX  Wound Culture: No results found for: WOUNDCULT    Last 24 Hours Medication List:     amLODIPine 5 mg Oral Daily   [START ON 11/23/2017] aspirin 81 mg Oral Daily   atorvastatin 40 mg Oral QPM   enoxaparin 40 mg Subcutaneous Daily        Today, Patient Was Seen By: Zulay Serna MD    ** Please Note: Dragon 360 Dictation voice to text software may have been used in the creation of this document   **

## 2017-11-22 NOTE — CONSULTS
Consultation - Cardiology    Clint Pires 65 y o  male MRN: [id number]  Unit/Bed#: FT 02 Encounter: [id number]    Physician Requesting Consult: Ubaldo Mandujano,*    Reason for Consult / Principal Problem: chest pain    HPI: Clint Pires is a 65y o  year old male who presented to the emergency with complaints of chest pain  Patient states that it started suddenly yesterday while at his desk  He describes sharp pain in which became so severe that he felt short of breath that his chest was tight  Patient admits to associated diaphoresis  He relates that his blood pressure was checked by the school nurse noted it to be 140/100  He denies nausea/vomiting, dizziness  He denies exacerbation of the pain or deep breathing, coughing  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 which he feels may be contributing  Patient admits he has had similar symptoms past however they were not as severe  He denies ever having exertional symptoms he states he is very active often  He states last week he ran 10K without difficulty  He does admit to heavy caffeine intake  Historical Information   Past Medical History:   Diagnosis Date    Bradycardia with 41-50 beats per minute     Hypertension     Thoracic aortic aneurysm without rupture (HCC)      Past Surgical History:   Procedure Laterality Date    APPENDECTOMY      BACK SURGERY       History   Alcohol Use No     History   Drug Use No     History   Smoking Status    Former Smoker   Smokeless Tobacco    Never Used       FAMILY HISTORY:  History reviewed  No pertinent family history      MEDS & ALLERGIES:  all current active meds have been reviewed and current meds:   Current Facility-Administered Medications   Medication Dose Route Frequency    acetaminophen (TYLENOL) tablet 650 mg  650 mg Oral Q6H PRN    amLODIPine (NORVASC) tablet 5 mg  5 mg Oral Daily    [START ON 11/23/2017] aspirin chewable tablet 81 mg  81 mg Oral Daily    atorvastatin (LIPITOR) tablet 40 mg  40 mg Oral QPM    enoxaparin (LOVENOX) subcutaneous injection 40 mg  40 mg Subcutaneous Daily    sodium chloride (PF) 0 9 % injection 3 mL  3 mL Intravenous PRN        No Known Allergies      REVIEW OF SYSTEMS:  Constitutional: Denies fever or chills  Eyes: Denies visual disturbance change in visual acuity   HENT: Denies nasal congestion or sore throat   Respiratory: + shortness of breath chest tightness Denies cough  Cardiovascular: + chest pain, diaphoresis Denies palpitations or lower extremity swelling   GI: Denies abdominal pain, nausea, vomiting, bloody stools or diarrhea   : Denies dysuria, frequency, hematuria  Musculoskeletal: Denies back pain or joint pain   Neurologic: Denies , syncope, headache, focal weakness or sensory changes   Psychiatric: + anxiety      PHYSICAL EXAM:  Vitals:   Vitals:    11/22/17 0753   BP: 132/76   Pulse: 61   Resp: 16   Temp: 97 6 °F (36 4 °C)   SpO2: 100%     There is no height or weight on file to calculate BMI  Systolic (38SVS), ETX:536 , Min:120 , GPB:524     Diastolic (11JRJ), AAX:04, Min:69, Max:93    No intake or output data in the 24 hours ending 11/22/17 0842  Weight (last 2 days)     Date/Time   Weight    11/21/17 1559  86 9 (191 58)            Invasive Devices     Peripheral Intravenous Line            Peripheral IV 11/21/17 Right Antecubital less than 1 day                General: Patient is not in acute distress  Laying comfortably in the bed  Head: Normocephalic  Atraumatic  HEENT: Both pupils normal sized, round and reactive to light  Nonicteric  Neck: JVP not elevated  Trachea central    Respiratory: Bilateral bronchovascular breath sounds with no crackles or rhonchi  Cardiovascular: RRR  S1 and S2 normal  No murmur, rub or gallop  GI: Abdomen soft, nontender  No guarding or rigidity  Neurologic: Patient is awake, alert, responding to commands  Well-oriented to time, place and person   Moving all extremities  Extremities: No clubbing, cyanosis or edema  Integument: No skin rashes or ulceration      LABORATORY RESULTS:    Results from last 7 days  Lab Units 11/21/17  1942 11/21/17  1631   TROPONIN I ng/mL <0 02 <0 02       CBC with diff:   Results from last 7 days  Lab Units 11/22/17  0527 11/21/17  1631   WBC Thousand/uL 4 99 5 66   HEMOGLOBIN g/dL 14 3 14 2   HEMATOCRIT % 43 2 42 6   MCV fL 94 93   PLATELETS Thousands/uL 170 181   MCH pg 31 0 31 1   MCHC g/dL 33 1 33 3   RDW % 12 9 12 7   MPV fL 9 6 9 4   NRBC AUTO /100 WBCs  --  0       CMP:  Results from last 7 days  Lab Units 11/22/17  0527 11/21/17  1631   SODIUM mmol/L 142 141   POTASSIUM mmol/L 4 5 4 3   CHLORIDE mmol/L 105 103   CO2 mmol/L 29 31   ANION GAP mmol/L 8 7   BUN mg/dL 18 23   CREATININE mg/dL 1 02 0 96   GLUCOSE RANDOM mg/dL 95 101   CALCIUM mg/dL 9 0 9 3   AST U/L  --  27   ALT U/L  --  39   ALK PHOS U/L  --  50   TOTAL PROTEIN g/dL  --  7 3   ALBUMIN g/dL  --  3 9   BILIRUBIN TOTAL mg/dL  --  0 30   EGFR ml/min/1 73sq m 82 88       BMP:  Results from last 7 days  Lab Units 11/22/17  0527 11/21/17  1631   SODIUM mmol/L 142 141   POTASSIUM mmol/L 4 5 4 3   CHLORIDE mmol/L 105 103   CO2 mmol/L 29 31   BUN mg/dL 18 23   CREATININE mg/dL 1 02 0 96   GLUCOSE RANDOM mg/dL 95 101   CALCIUM mg/dL 9 0 9 3              Results from last 7 days  Lab Units 11/22/17  0527 11/21/17  1631   MAGNESIUM mg/dL 2 0 2 1           Results from last 7 days  Lab Units 11/21/17  1631   TSH 3RD GENERATON uIU/mL 2 724           Lipid Profile:   Lab Results   Component Value Date    CHOL 206 (H) 11/22/2017     Lab Results   Component Value Date    HDL 42 11/22/2017     Lab Results   Component Value Date    LDLCALC 138 (H) 11/22/2017     Lab Results   Component Value Date    TRIG 131 11/22/2017           Imaging: I have personally reviewed pertinent reports  EKG reviewed personally:  Sinus bradycardia, otherwise normal EKG        Assessment and Plan:  1   Chest pain  -troponin negative, EKG unremarkable  -CTA negative for PE or dissection  -however given patient's recurrent symptoms echocardiogram of structural disease as well stress test evaluate ischemic etiology of symptoms    2  Sinus bradycardia-patient asymptomatic  Likely secondary to patient being well conditioned  3   Thoracic aortic aneurysm- 4 2 cm on CT  Would recommend beta-blocker, however baseline bradycardia precludes use at this time  4   Elevated blood pressure-patient does not have history of hypertension  Patient was placed on amlodipine this visit  Will continue to monitor    5  Hyperlipidemia-, total cholesterol 206  Patient was placed on atorvastatin    Code Status: Level 1 - Full Code      Thank you for allowing us to participate in this patient's care  This pt will follow up with Dr Sahra De Anda once discharged  Counseling / Coordination of Care  Total floor / unit time spent today 35 minutes  Greater than 50% of total time was spent with the patient and / or family counseling and / or coordination of care  A description of the counseling / coordination of care: Review of history, current assessment, development of a plan         Shabnam Rodriguez PA-C  11/22/2017,8:42 AM

## 2017-11-22 NOTE — DISCHARGE SUMMARY
Discharge Summary - [facility]    Patient Information: Sudhakar Poon 65 y o  male MRN: [id number]  Unit/Bed#: FT 02 Encounter: [id number]    Discharging Physician / Practitioner: Clovis Canales MD  PCP: No primary care provider on file  Admission Date: 11/21/2017  Discharge Date: 11/22/17    Reason for Admission:  Chest pain    Discharge Diagnoses:     Principal Problem:    Chest pain  Active Problems:    Bradycardia    Elevated blood pressure reading    Aneurysmal dilatation (HCC)  Resolved Problems:    * No resolved hospital problems  *      Consultations During Hospital Stay:  · Cardiology  · Vascular surgery    Procedures Performed:     · Echocardiogram  · Stress test  · Chest x-ray  · CTA dissection protocol chest and abdomen    Significant Findings:     · A normal stress test, negative for ischemia  · Ejection fraction 60% with mild concentric hypertrophy, trace mitral and tricuspid regurgitation  Aortic root exhibited normal size  · 4 2 cm ascending thoracic aortic aneurysm no dissection  · Normal troponin trend  · Total cholesterol 206, HDL 42,     Incidental Findings:   · Aneurysmal dilation of ascending aorta    Test Results Pending at Discharge (will require follow up): · None     Outpatient Tests Requested:  · n/a    Complications:  None    Hospital Course: Sudhakar Poon is a 65 y o  male patient with no pertinent past medical history who originally presented to the hospital on 11/21/2017 due to sudden onset of chest pain associated with radiation to left arm, diaphoresis and lightheadedness  Patient presented with regular sinus bradycardia, patient is very active physically he does participate in triathlons ,marathons    Patient's blood pressure was noted to be mildly elevated in the ED  Workup revealed incidental findings of ascending thoracic aorta dilation  Patient was admitted to internal medicine service for observation and further workup of his chest pain complaint  Cardiology was consulted  Given patient's elevated blood pressure, chest pain, mild bradycardia, and findings of CTA echocardiogram and stress test were recommended  Patient remained chest pain-free, he was started on aspirin and statin, given his mild bradycardia beta-blocker was not initiated, for blood pressure control especially in light of thoracic aorta dilation patient was started on Norvasc  Patient was also examined by vascular surgery and was recommended outpatient follow-up with cardiothoracic surgery for surveillance, patient has no family history of aortic aneurysm or intracranial aneurysm  Patient is cleared for discharge with prescription for aspirin, Lipitor and Norvasc  He was given follow-up information with Cardiology and cardiothoracic surgery        Condition at Discharge: stable     Discharge Day Visit / Exam:     Subjective:  Patient admitted last night for observation, overnight continues to be intermittently in mild sinus bradycardia, denies any chest pain  Vitals: Blood Pressure: 131/77 (11/22/17 1051)  Pulse: 56 (11/22/17 1051)  Temperature: 97 6 °F (36 4 °C) (11/22/17 0753)  Temp Source: Oral (11/22/17 0753)  Respirations: 16 (11/22/17 1051)  Weight - Scale: 86 9 kg (191 lb 9 3 oz) (11/21/17 1559)  SpO2: 99 % (11/22/17 1051)  Exam:   Physical Exam   Constitutional: He is oriented to person, place, and time  He appears well-developed and well-nourished  No distress  HENT:   Head: Normocephalic and atraumatic  Eyes: Conjunctivae and EOM are normal  Pupils are equal, round, and reactive to light  Neck: Normal range of motion  Neck supple  No JVD present  No thyromegaly present  Cardiovascular: Normal rate and regular rhythm  No murmur heard  No bradycardia during my assessment   Pulmonary/Chest: Effort normal  No respiratory distress  Musculoskeletal: Normal range of motion  Neurological: He is alert and oriented to person, place, and time   No cranial nerve deficit  Coordination normal    Skin: Skin is warm and dry  He is not diaphoretic  Psychiatric: His behavior is normal  Judgment and thought content normal  His mood appears anxious  Nursing note and vitals reviewed  Discharge instructions/Information to patient and family:   See after visit summary for information provided to patient and family  Provisions for Follow-Up Care:  See after visit summary for information related to follow-up care and any pertinent home health orders  Disposition:     Home    For Discharges to [address] [facility]:   · Not Applicable to this Patient - Not Applicable to this Patient    Planned Readmission: no     Discharge Statement:  I spent 40 minutes discharging the patient  This time was spent on the day of discharge  I had direct contact with the patient on the day of discharge  Greater than 50% of the total time was spent examining patient, answering all patient questions, arranging and discussing plan of care with patient as well as directly providing post-discharge instructions  Additional time then spent on discharge activities  Discharge Medications:  See after visit summary for reconciled discharge medications provided to patient and family  ** Please Note: Dragon 360 Dictation voice to text software may have been used in the creation of this document   **

## 2017-11-22 NOTE — H&P
History and Physical - [facility]    Patient Information: Renetta Zamorano 65 y o  male MRN: [id number]  Unit/Bed#: FT 02 Encounter: [id number]  Admitting Physician: OBED [place]  PCP: No primary care provider on file  Date of Admission:  11/21/17    Assessment/Plan:    Hospital Problem List:     Principal Problem:    Chest pain  Active Problems:    Bradycardia    Elevated blood pressure, situational    Aneurysm ([address] )      Plan for the Primary Problem(s):  · Chest pain:  Possible ACS  Persistent, 2/10 pain scale   "like a muscle injury" Symptoms typical   Left chest radiating to left shoulder left arm, jaw and left scapular  CTA reviewed negative for acute PE, note 4 2 cm ascending thoracic aortic aneurysm  No evidence of an aortic dissection  · EKG and troponin reviewed unremarkable  Will trend  · Cardiology consult  · Telemetry  · Monitor for risk factors, lipid panel and hemoglobin A1c   · Echo  · Will initiate Norvasc, low-dose Lipitor, aspirin  Plan for Additional Problems:     · Bradycardia:  Asymptomatic  Pt report hx triathlon, cyclist, runner, athlete related bradycardia  Continue telemetry, cardiology consult  Continue to monitor  · Elevated blood pressure likely situational:  Patient denies history of, Will initiate Norvasc 5 mg  · Incidental finding:  Aneurysm:  New onset:  Noted from CTA 4 2 cm ascending thoracic aortic aneurysm  No evidence of an aortic dissection  Will consult vascular for further evaluation and management  VTE Prophylaxis: Enoxaparin (Lovenox)  / sequential compression device   Code Status: Full code  POLST: There is no POLST form on file for this patient (pre-hospital)    Anticipated Length of Stay:  Patient will be admitted on an Observation basis with an anticipated length of stay of  less than 2 midnights     Justification for Hospital Stay:  Rule out ACS, Cardiology workup    Total Time for Visit, including Counseling / Coordination of Care: 45 minutes  Greater than 50% of this total time spent on direct patient counseling and coordination of care  Chief Complaint:   Chest pain    History of Present Illness:    Sudhakar Poon is a 65 y o  male who presents with left chest pain radiating to left shoulder, left arm, left scapular and jaw  Associated with diffuse diaphoresis and lightheadedness  Patient report onset about 1 month ago  With worsening of symptoms today  Patient report highly stressful work environment  He denies shortness of breath, nausea, vomiting, fever chills  Review of Systems:    Review of Systems   Constitutional: Positive for diaphoresis  Respiratory: Negative for chest tightness and shortness of breath  Cardiovascular: Positive for chest pain  Negative for palpitations  Neurological: Positive for light-headedness  All other systems reviewed and are negative  Past Medical and Surgical History:     History reviewed  No pertinent past medical history  Past Surgical History:   Procedure Laterality Date    APPENDECTOMY      BACK SURGERY         Meds/Allergies:    Prior to Admission medications    Not on File     I have reviewed home medications with patient personally  Allergies: No Known Allergies    Social History:     Marital Status: /Civil Union   Occupation:  Principal  Patient Pre-hospital Living Situation:  At home with family  Patient Pre-hospital Level of Mobility:  Independent  Patient Pre-hospital Diet Restrictions:  None  Substance Use History:   History   Alcohol Use No     History   Smoking Status    Former Smoker   Smokeless Tobacco    Never Used     History   Drug Use No       Family History:    Father CAD        Physical Exam:     Vitals:   Blood Pressure: 141/76 (17)  Pulse: 60 (17)  Temperature: 98 3 °F (36 8 °C) (17 155)  Temp Source: Oral (17)  Respirations: 18 (17)  Weight - Scale: 86 9 kg (191 lb 9 3 oz) (11/21/17 4479)  SpO2: 96 % (11/21/17 1945)    Physical Exam   Constitutional: He is oriented to person, place, and time  He appears well-developed and well-nourished  HENT:   Head: Normocephalic and atraumatic  Cardiovascular: Normal rate, regular rhythm and normal heart sounds  No murmur heard  Chest Pain 2/10   Pulmonary/Chest: Effort normal and breath sounds normal  No respiratory distress  He exhibits no tenderness  Abdominal: Soft  Bowel sounds are normal  He exhibits no distension  There is no tenderness  Musculoskeletal: Normal range of motion  He exhibits no edema  Neurological: He is alert and oriented to person, place, and time  Skin: Skin is warm and dry  Psychiatric: He has a normal mood and affect  Nursing note and vitals reviewed  Additional Data:     Lab Results: I have personally reviewed pertinent reports  Results from last 7 days  Lab Units 11/21/17  1631   WBC Thousand/uL 5 66   HEMOGLOBIN g/dL 14 2   HEMATOCRIT % 42 6   PLATELETS Thousands/uL 181   NEUTROS PCT % 49   LYMPHS PCT % 36   MONOS PCT % 8   EOS PCT % 6       Results from last 7 days  Lab Units 11/21/17  1631   SODIUM mmol/L 141   POTASSIUM mmol/L 4 3   CHLORIDE mmol/L 103   CO2 mmol/L 31   BUN mg/dL 23   CREATININE mg/dL 0 96   CALCIUM mg/dL 9 3   TOTAL PROTEIN g/dL 7 3   BILIRUBIN TOTAL mg/dL 0 30   ALK PHOS U/L 50   ALT U/L 39   AST U/L 27   GLUCOSE RANDOM mg/dL 101           Imaging: I have personally reviewed pertinent reports  X-ray Chest 2 Views    Result Date: 11/21/2017  Narrative: CHEST INDICATION:  Chest pain  COMPARISON:  May 21, 2012  VIEWS:  Frontal and lateral projections IMAGES:  2 FINDINGS:     Cardiomediastinal silhouette appears unremarkable  The lungs are clear  No pneumothorax or pleural effusion  Healed lateral right rib fractures noted  No acute osseous abnormality  Impression: No active pulmonary disease   Workstation performed: [id number]     Cta Dissection Protocol Chest And Abdomen    Result Date: 11/21/2017  Narrative: CTA - CHEST AND ABDOMEN  - WITHOUT AND WITH IV CONTRAST INDICATION:  Back pain  COMPARISON:  None  TECHNIQUE: CT examination of the chest and abdomen was performed both prior to and after the administration of intravenous contrast   Thin section angiographic arterial phase post contrast technique was used in order to evaluate for aortic dissection  3D reformatted images and volume rendering were performed on an independent workstation  Additionally, reformatted images were created in axial, sagittal, and coronal planes  Radiation dose length product (DLP) for this visit:  956 67 mGy-cm   This examination, like all CT scans performed in [facility], was performed utilizing techniques to minimize radiation dose exposure, including the use of iterative  reconstruction and automated exposure control  IV Contrast:  100 mL of iohexol (OMNIPAQUE)     Enteric Contrast: Enteric contrast was not administered  FINDINGS: AORTA: There is no aortic dissection  There is aneurysmal dilatation of the ascending thoracic aorta measuring 4 2 cm in diameter         CHEST LUNGS:  There is a 4 mm pleural-based nodule at the left pulmonary fissure likely representing a tiny intrapulmonary lymph node  No infiltrate or pleural effusion  PLEURA:  Unremarkable  HEART/PULMONARY ARTERIAL TREE:  Heart is unremarkable for the patient's age  Within the limitations of this examination there is no evidence of pulmonary embolus  MEDIASTINUM AND ARACELI:  Unremarkable  CHEST WALL AND LOWER NECK:   There is a 2 mm low-attenuation right lower lobe nodule   Incidental discovery of one or more thyroid nodule(s) measuring less than 1 5 cm and without suspicious features is noted in this patient who is above 28years old; according to guidelines published in the February 2015 white paper on incidental thyroid nodules in the Journal of the [employer] of [employer]iology [place], no further evaluation is recommended  ABDOMEN LIVER/BILIARY TREE:  Unremarkable  GALLBLADDER:  No calcified gallstones  No pericholecystic inflammatory change  SPLEEN:  Unremarkable  PANCREAS:  Unremarkable  ADRENAL GLANDS:  Unremarkable  KIDNEYS/URETERS:  Unremarkable  No hydronephrosis  VISUALIZED STOMACH AND BOWEL:  Unremarkable  VISUALIZED ABDOMINOPELVIC CAVITY:  No ascites or free intraperitoneal air  No lymphadenopathy  ABDOMINAL WALL:  Unremarkable  OSSEOUS STRUCTURES:  No acute fracture or destructive osseous lesion  Impression: 4 2 cm ascending thoracic aortic aneurysm  No evidence of an aortic dissection  No infiltrate or pleural effusion  No acute intra-abdominal abnormality  No free air or free fluid  Workstation performed: [id number]       EKG, Pathology, and Other Studies Reviewed on Admission:   · EKG:  Sinus Deepak Grime    [employer] Records Reviewed: Yes     ** Please Note: Dragon 360 Dictation voice to text software may have been used in the creation of this document   **

## 2017-11-22 NOTE — DISCHARGE INSTRUCTIONS
Bradycardia   WHAT YOU NEED TO KNOW:   What is bradycardia? Bradycardia is a slow heart rate of fewer than 60 beats per minute  A slow heart rate is normal for some people, such as athletes, and needs no treatment  Bradycardia may also be caused by health conditions that do need treatment  What causes bradycardia? · Heart damage caused by a heart attack or heart disease    · Problems with your heart node (tissue that generates electrical signals)    · Conditions that affect the way electrical signals travel through your heart and cause your heart to beat     · Certain infections, such as Lyme disease    · Hypothermia (low body temperature), or health problems that cause low oxygen levels    · Low thyroid hormone levels, low blood sugar levels, or electrolyte balance problems    · Certain medicines, such as heart medicines  What other signs and symptoms may occur with bradycardia? You may have no other signs or symptoms, or you may have any of the following:  · Tiredness and shortness of breath     · Dizziness    · Lightheadedness     · Confusion     · Chest pain    · Cool and pale or bluish skin  How is bradycardia diagnosed? Your healthcare provider will ask about your symptoms and when they started  He will ask what triggers your symptoms and if they get worse with exercise  He may ask if you have a heart condition or take any medicines  You may also need any of the following:  · Blood tests  may be done to see if you have any heart damage  They may also be used to find the cause of your bradycardia  · An EKG  records the electrical activity of your heart  It may be used to check for heart damage from a heart attack or problems with the way electrical signals travel through your heart  · A heart monitor will be used to track your heart rate and rhythm if you are in the hospital  You may also need to wear it for several days at home  How is bradycardia treated? You may not need any treatment  Bradycardia is usually treated if it causes symptoms, such as dizziness or fainting  You may need any of the following:  · Heart medicines  may be given to increase your heart rate  These medicines are given through an IV  · A temporary pacemaker  is a short-term treatment in the hospital  The pacemaker is applied to your skin with sticky pads or placed into a vein in your neck or chest  A small pacing device helps keep your heartbeat stable  · A permanent pacemaker  is implanted under the skin of your chest or abdomen during surgery  A tiny battery creates electrical impulses that keep your heart rate regular  What are the risks of bradycardia? You may faint when your heart rate gets too low  You may develop seizures, high blood pressure, chest pain, or heart failure  Your heart may stop beating  Where can I find more information? · [address]  [place] , [place]   Phone: [phone]  Web Address: [email or web address]   When should I contact my healthcare provider? · You are more tired than usual, even with treatment  · You have questions or concerns about your condition or care  When should I seek immediate care or call 911? · You feel lightheaded or faint  · You have new or worsening dizziness, shortness of breath, chest pain, or confusion  · Your pulse rate is lower than your healthcare provider says it should be, even with treatment  CARE AGREEMENT:   You have the right to help plan your care  Learn about your health condition and how it may be treated  Discuss treatment options with your caregivers to decide what care you want to receive  You always have the right to refuse treatment  The above information is an  only  It is not intended as medical advice for individual conditions or treatments   Talk to your doctor, nurse or pharmacist before following any medical regimen to see if it is safe and effective for you   © 2017 [facility] Information is for End User's use only and may not be sold, redistributed or otherwise used for commercial purposes  All illustrations and images included in CareNotes® are the copyrighted property of [employer]  or Ernesto Guerrero

## 2017-11-27 LAB
CHEST PAIN STATEMENT: NORMAL
MAX DIASTOLIC BP: 68 MMHG
MAX HEART RATE: 155 BPM
MAX PREDICTED HEART RATE: 164 BPM
MAX. SYSTOLIC BP: 168 MMHG
PROTOCOL NAME: NORMAL
REASON FOR TERMINATION: NORMAL
TARGET HR FORMULA: NORMAL
TEST INDICATION: NORMAL
TIME IN EXERCISE PHASE: NORMAL

## 2017-12-05 DIAGNOSIS — E78.2 MIXED HYPERLIPIDEMIA: ICD-10-CM

## 2017-12-06 ENCOUNTER — ALLSCRIPTS OFFICE VISIT (OUTPATIENT)
Dept: OTHER | Facility: OTHER | Age: 57
End: 2017-12-06

## 2017-12-08 NOTE — PROGRESS NOTES
Assessment    1  Encounter to establish care (V65 8) (Z76 89)   2  Ascending aortic aneurysm (441 2) (I71 2)   3  Essential hypertension (401 9) (I10)   4  Hyperlipemia, mixed (272 2) (E78 2)    Plan   Ascending aortic aneurysm    · Nitrostat 0 4 MG Sublingual Tablet Sublingual (Nitroglycerin); PLACE 1 TABLETUNDER THE TONGUE EVERY 5 MINUTES UP TO 3 DOSES AS NEEDED FORCHEST PAIN  Essential hypertension    · AmLODIPine Besylate 5 MG Oral Tablet; Take 1 tablet daily   · A diet low in sodium and high in potassium, magnesium, and calcium can help yourblood pressure ; Status:Complete;   Done: 43PND9029   · Continue with our present treatment plan ; Status:Complete;   Done: 02WQT5155   · Take your blood pressure twice a week  Record the numbers and bring them with you toyour appointments ; Status:Complete;   Done: 41OYV0603   · We recommend that you follow the Mediterranean diet ; Status:Complete;   Done:77Hlo2172  Hyperlipemia, mixed    · Crestor 10 MG Oral Tablet (Rosuvastatin Calcium); TAKE 1 TABLET DAILY   · (1) ALT (SGPT); Status:Active; Requested for:45Kzd5270;    · (1) AST (SGOT); Status:Active; Requested for:71Vvz2019;    · (1) LDL,DIRECT; Status:Active; Requested for:23Axu3434; Follow-up visit in 3 months Evaluation and Treatment  Follow-up  Status: Hold For - Scheduling  Requested for: 05VJR7969 Ordered; For: Hyperlipemia, mixed;  Ordered By: Indira Braxton  Performed:   Due: 06HTS8712   Discussion/Summary  Discussion Summary:   ABSOLUTELY NO EXERCISINGGYM NO RUNNING NO NOTHING UNTIL THE SURGICAL REPAIR OF YOUR AORTIC ANEURYSMDISCUSSION ABOUT THE RISKS OF THORACIC AORTIC ANEURYSMSWILL SEE YOU AFTER YOUR SURGERY WITH DR Darylene Sprawls AFTER THE SURGICAL REPAIR OF YOUR AORTA WILL BE REDUCING CARDIAC RISK FACTORS KNOW YOU ARE VERY FIT YOU HAVE A STRONG FAMILY HISTORY OF PREMATURE HEART DISEASE AND DIABETES  INCLUDEPRESSURE CONTROL WITH GOAL BEING LESS THAN 120/80REDUCTION TO LESS THAN 70  HDL AT LEAST ABOVE 50  FASTING BLOOD SUGAR UNDER 100 AND SERUM INSULIN LEVEL UNDER 5  MAINTAIN A NORMAL CRP  YOUR ASPIRIN EVERY DAYTHE ANTI-INFLAMMATORY AND MEDITERRANEAN DIET  IS DUE TO YOUR STRONG FAMILY HISTORY OF DIABETES  (MOTHER AND FATHER BOTH WITH DIABETES) EVEN THOUGH YOU ARE FIT IT IS STILL A RISK  AM CHANGING YOUR ATORVASTATIN TO CRESTOR 10 MG ONCE A DAY  given script to have direct LDL , AST, ALT checked - 6 weeks on LipitorBP once a day, scatter the times , record numbers call with numbers once a weekmay adjust BP meds to get to goal of 120/80watch diet - See advice below  Mediterranean diet provides guidelines for healthy foods you should add to your daily diet  several servings of plant foods  These include fruits and vegetables, whole grain breads , beans, nuts, and seeds  possible, eat fresh rather than canned, frozen or processed foods  olive oil for cooking and salad dressings  Use products such as Smart balance as butter substitutes for meals and cooking  These products are high in omega 3 fatty acids and can actually improve your cholesterol profile  your intake of saturated fats like butter, margarine, lard, and animal fat  for low-fat and non-fat types of dairy products to increase your calcium intake  Skim milk, yogurt and cheese can be excellent forms of calcium, however, cheese can also be high in saturated fats  Mozzarella cheese is much lower in fat than most types of cheese  If alcohol has never been a problem for you, you may have one 4 oz (for women) or 6 oz (for men) glass of red wine with dinner  If alcohol is not a good idea for you, try adding red grape juice/fresh red grapes/berries, blue berries to your diet instead  It is thought to provide you with the same heart-protecting benefits of wine without alcohol  â¢ Eat baked and sauteed fish, chicken, turkey  â¢For dessert, eat fresh fruit, berries, homemade desserts sweetened with natural sugars    â¢ Eat no more than 3 eggs a week, including eggs used in cooking  Egg yolks contain saturated fat  changes and medicine cannot cure high blood pressure  the lifestyle advice that we've discussed today and medications can help reduce your BP  With good control, many problems related to hypertension, such as stroke, heart attack, blindness, kidney damage can be avoided  will need to see you often until your blood pressure is controlled  we prescribe medicine, keep taking it even if your blood pressure becomes normal blood pressure will normalize because of the medicine  Do not stop taking it without talking with us first    will need to monitor your blood pressure by taking it at home once a day  these numbers in a log and bring this record with you to your office visits  Continue to eat well, exercise, and do not smoke  decrease the sodium in your diet: Â· Use fresh vegetables and foods as much as possible  Â· Avoid canned and processed foods  Cured meats such as reed, ham, and sausages are high in salt  Â· Try using different herbs and spices in your cooking instead of salt  Â· In restaurants, avoid foods with sauces, cheese, and cured meats  Ask for low-sodium choices  To get more potassium in your diet, eat: Â· Bananas, fresh or dried apricots, peaches, citrus fruits, melons Â· Cauliflower, broccoli, tomatoes, carrots, raw spinach, beet greens, potatoes To get more magnesium in your diet, eat: Â· Whole grain foods, leafy green vegetables, dried fruits Â· Fish and seafood, poultry To get more calcium in your diet, eat: Â· Nonfat milk, yogurt, and low-fat cheeses Â· Suring and sardines Â· All these changes help keep your blood pressure down  What to watch for   Call or seek medical care right away if you have any of the following symptoms:A sudden severe headacheTrouble breathingChest painWeakness, tingling, or numbness in hands or feetVision changes or double vision      Counseling Documentation With Imm: The patient was counseled regarding diagnostic results,-- instructions for management,-- risk factor reductions,-- prognosis,-- patient and family education,-- impressions  Medication SE Review and Pt Understands Tx: Possible side effects of new medications were reviewed with the patient/guardian today  The treatment plan was reviewed with the patient/guardian  The patient/guardian understands and agrees with the treatment plan      History of Present Illness  HPI: Here to establishf/u [facility] ER visit for sudden onset of severe, radiating CPTrop trendnegD Echo shows Thoracic Aortic Aneur 4 2 cmFOLLOWED UP WITH DR CARCAMO WHO READ THE 2D ECHOTHORACIC AORTIC ANEURYSM ACTUALLY 4 9 CM ON RECHECKSCHEDULED FOR EARLY JANUARY60%HR 61 140/100very fit  Diego Mallory a very intense marathon  on Norvasc, ASA and Lipitor for HTN and elevated LDL 138blocker held due to bradycardiafamily h/o premature CAD - father with MI under age 48    Chest Pain (Brief): The patient is being seen for an initial evaluation of chest pain  Management changes made at the last visit include adding lipitor, norvasc, ASA  Current treatment includes aspirin, calcium channel blocker and statin  Pertinent medical history:  no deep vein thrombosis,-- no pulmonary embolus-- and-- no aortic stenosis  Risk factors:  hypertension,-- high LDL cholesterol-- and-- stress, but-- no tobacco use,-- no diabetes,-- no atherogenic diet,-- no obesity,-- no sedentary lifestyle-- and-- no prolonged bedrest  He was previously evaluated in the emergency room  Previous presentation included chest pain, shortness of breath, shoulder pain, arm pain and upper back pain  Past evaluation has included complete blood count, electrolytes, BUN/creatinine, cardiac enzymes, electrocardiogram, stress ECG testing, echocardiography, chest x-ray and chest CT  Past treatment has included aspirin and calcium channel blockers  Hypertension (Initial):  The patient is being seen for follow-up from an emergency room visit for essential hypertension  The patient is currently asymptomatic  Associated symptoms:  chest pain  Pertinent medical history:  no diabetes  Risk factors:  no obesity,-- no physical inactivity,-- no illicit drug use-- and-- no smoking  Aortic Aneurysm, Thoracic (Brief): The patient is being seen for follow-up from an emergency room visit for thoracic aortic aneurysm  Symptoms:  no chest pain  The patient is currently experiencing symptoms  Current treatment includes blood pressure control  Pertinent medical history:  hypertension-- and-- dyslipidemia, but-- no myocardial infarction-- and-- no diabetes  Previous presentation included chest pain  Past evaluation has included ECG, chest x-ray, echocardiogram and chest CT  Past treatment has included blood pressure control and dyslipidemia treatment  Hyperlipidemia (Initial): The patient is being seen for a routine clinic follow-up of hyperlipidemia  Recent measurements:  mg/dL  Associated symptoms:  no chest pain  No associated symptoms are reported  Current treatment includes statins  By report, there is good compliance with treatment and good tolerance of treatment  There are no known treatment complications  Pertinent medical history:  hypertension-- and-- aortic aneurysm  Risk factors:  no inactivity,-- no obesity,-- no poor diet,-- no tobacco use-- and-- no illicit drug use  Family history:  coronary artery disease  The patient presented due to elevated lipid levels  Past treatment has included statins  Review of Systems  Complete-Male:  Constitutional: No fever or chills, feels well, no tiredness, no recent weight gain or weight loss  Eyes: No complaints of eye pain, no red eyes, no discharge from eyes, no itchy eyes  ENT: no complaints of earache, no hearing loss, no nosebleeds, no nasal discharge, no sore throat, no hoarseness  Cardiovascular: slow heart rate-- and-- HYPERTENSION, HYPERLIPIDEMIA, ASCENDING AORTIC ANEURYSM, PRESYNCOPE  Respiratory: shortness of breath during exertion  Gastrointestinal: No complaints of abdominal pain, no constipation, no nausea or vomiting, no diarrhea or bloody stools  Genitourinary: No complaints of dysuria, no incontinence, no hesitancy, no nocturia, no genital lesion, no testicular pain  Neurological: No compliants of headache, no confusion, no convulsions, no numbness or tingling, no dizziness or fainting, no limb weakness, no difficulty walking  Endocrine: No complaints of proptosis, no hot flashes, no muscle weakness, no erectile dysfunction, no deepening of the voice, no feelings of weakness  PHQ-9 Depression Scale: Over the past 2 weeks, how often have you been bothered by the following problems? 1 ) Little interest or pleasure in doing things? Not at all   2 ) Feeling down, depressed or hopeless? Not at all   3 ) Trouble falling asleep or sleeping too much? Not at all   4 ) Feeling tired or having little energy? Not at all   5 ) Poor appetite or overeating? Not at all   6 ) Feeling bad about yourself, or that you are a failure, or have let yourself or your family down? Not at all   7 ) Trouble concentrating on things, such as reading a newspaper or watching television? Not at all   8 ) Moving or speaking so slowly that other people could have noticed, or the opposite, moving or speaking faster than usual? Not at all  How difficult have these problems made it for you to do your work, take care of things at home, or get along with people? Not at all  Score    ROS Reviewed:   ROS reviewed  Past Medical History  1  No pertinent past medical history  Active Problems And Past Medical History Reviewed: The active problems and past medical history were reviewed and updated today  Surgical History  1  History of Appendectomy   2  History of Back Surgery  Surgical History Reviewed: The surgical history was reviewed and updated today  Family History  Mother    1   Family history of Diabetes mellitus of other type with complication   2  Family history of cardiac disorder (V17 49) (Z82 49)   3  FH: premature coronary heart disease (V17 3) (Z82 49)  Father    4  Family history of Diabetes mellitus of other type with complication   5  Family history of cardiac disorder (V17 49) (Z82 49)   6  FH: premature coronary heart disease (V17 3) (Z82 49)  Family History    7  Family history of sudden death (V24 11) (Z80 80)  Family History Reviewed: The family history was reviewed and updated today  Social History     · Former smoker (K51 20) (Q70 048)   · No alcohol use  Social History Reviewed: The social history was reviewed and updated today  The social history was reviewed and is unchanged  Current Meds   1  AmLODIPine Besylate 5 MG Oral Tablet; Take 1 tablet daily; Therapy: (Recorded:30Nov2017) to Recorded   2  Aspirin 81 MG Oral Tablet Chewable; CHEW AND SWALLOW 1 TABLET DAILY; Therapy: (Recorded:30Nov2017) to Recorded   3  Atorvastatin Calcium 40 MG Oral Tablet; TAKE 1 TABLET DAILY AS DIRECTED; Therapy: (Recorded:30Nov2017) to Recorded    Allergies  1  No Known Drug Allergies    Vitals  Vital Signs    Recorded: 72XUY2977 08:07AM   Temperature 96 5 F, Tympanic   Heart Rate 54   Respiration 14   Systolic 719, LUE   Diastolic 80, LUE   Height 6 ft 1 5 in   Weight 185 lb 6 oz   BMI Calculated 24 13   BSA Calculated 2 09   O2 Saturation 96       Physical Exam   Constitutional  General appearance: No acute distress, well appearing and well nourished  Eyes  Pupils and irises: Equal, round and reactive to light  Ears, Nose, Mouth, and Throat  Otoscopic examination: Tympanic membrance translucent with normal light reflex  Canals patent without erythema  Pulmonary  Respiratory effort: No increased work of breathing or signs of respiratory distress  Auscultation of lungs: Clear to auscultation, equal breath sounds bilaterally, no wheezes, no rales, no rhonci     Cardiovascular  Auscultation of heart: Abnormal  -- BRADYCARDIA  Carotid pulses: Normal    Lymphatic  Palpation of lymph nodes in neck: No lymphadenopathy  Musculoskeletal  Gait and station: Normal    Skin  Skin and subcutaneous tissue: Normal without rashes or lesions  Psychiatric  Orientation to person, place and time: Normal    Mood and affect: Normal          Results/Data  PHQ-2 Adult Depression Screening [id number] 08:11AM User, Ahs     Test Name Result Flag Reference   PHQ-2 Adult Depression Score 0       Over the last two weeks, how often have you been bothered by any of the following problems? Little interest or pleasure in doing things: Not at all - 0 Feeling down, depressed, or hopeless: Not at all - 0   PHQ-2 Adult Depression Screening Negative         Attending Note  Collaborating Physician Note: Collaborating Physician: I supervised the Advanced Practitioner-- and-- I agree with the Advanced Practitioner note  Future Appointments    Date/Time Provider Specialty Site   [facility]   12/15/2017 02:00 PM SAROJ Gann   Cardiology [facility] CARDIOLOGY [facility]   Electronically signed by : Angel Crawford  6 2017  3:35PM EST                       (Author)    Electronically signed by : Ramila Mckenzie DO; Dec  7 2017  9:38AM EST                       (Co-author)

## 2017-12-22 ENCOUNTER — APPOINTMENT (OUTPATIENT)
Dept: LAB | Facility: CLINIC | Age: 57
End: 2017-12-22
Payer: COMMERCIAL

## 2017-12-22 DIAGNOSIS — E78.2 MIXED HYPERLIPIDEMIA: ICD-10-CM

## 2017-12-22 LAB
ALT SERPL W P-5'-P-CCNC: 33 U/L (ref 12–78)
AST SERPL W P-5'-P-CCNC: 24 U/L (ref 5–45)
LDLC SERPL DIRECT ASSAY-MCNC: 75 MG/DL (ref 0–100)

## 2017-12-22 PROCEDURE — 36415 COLL VENOUS BLD VENIPUNCTURE: CPT

## 2017-12-22 PROCEDURE — 83721 ASSAY OF BLOOD LIPOPROTEIN: CPT

## 2017-12-22 PROCEDURE — 84460 ALANINE AMINO (ALT) (SGPT): CPT

## 2017-12-22 PROCEDURE — 84450 TRANSFERASE (AST) (SGOT): CPT

## 2017-12-27 ENCOUNTER — GENERIC CONVERSION - ENCOUNTER (OUTPATIENT)
Dept: OTHER | Facility: OTHER | Age: 57
End: 2017-12-27

## 2018-01-23 VITALS
SYSTOLIC BLOOD PRESSURE: 120 MMHG | DIASTOLIC BLOOD PRESSURE: 80 MMHG | OXYGEN SATURATION: 96 % | BODY MASS INDEX: 23.79 KG/M2 | HEART RATE: 54 BPM | WEIGHT: 185.38 LBS | RESPIRATION RATE: 14 BRPM | TEMPERATURE: 96.5 F | HEIGHT: 74 IN

## 2018-01-23 NOTE — RESULT NOTES
Verified Results  (1) LDL,DIRECT 22Dec2017 07:08[facility] Order Number: [id number]     Test Name Result Flag Reference   LDL, DIRECT 75 mg/dl  0-100   LDL Cholesterol:        Optimal          <100 mg/dl        Near Optimal     100-129 mg/dl        Above Optimal          Borderline High   130-159 mg/dl          High              160-189 mg/dl          Very High        >189 mg/dl     (1) AST (SGOT) 22Dec2017 07:08[facility] Order Number: [id number]     Test Name Result Flag Reference   AST(SGOT) 24 U/L  5-45   Specimen collection should occur prior to Sulfasalazine and/or Sulfapyridine administration due to the potential for falsely depressed results       (1) ALT (SGPT) 22Dec2017 07:08[facility] Order Number: [id number]     Test Name Result Flag Reference   ALT (SGPT) 33 U/L  12-78

## 2018-03-29 ENCOUNTER — OFFICE VISIT (OUTPATIENT)
Dept: FAMILY MEDICINE CLINIC | Facility: CLINIC | Age: 58
End: 2018-03-29
Payer: COMMERCIAL

## 2018-03-29 VITALS
WEIGHT: 184 LBS | SYSTOLIC BLOOD PRESSURE: 121 MMHG | OXYGEN SATURATION: 99 % | HEART RATE: 60 BPM | BODY MASS INDEX: 23.95 KG/M2 | DIASTOLIC BLOOD PRESSURE: 72 MMHG

## 2018-03-29 DIAGNOSIS — M94.0: ICD-10-CM

## 2018-03-29 DIAGNOSIS — Z71.1 CONCERN ABOUT CARDIOVASCULAR DISEASE WITHOUT DIAGNOSIS: ICD-10-CM

## 2018-03-29 DIAGNOSIS — I72.9 ANEURYSMAL DILATATION (HCC): ICD-10-CM

## 2018-03-29 DIAGNOSIS — I10 ESSENTIAL HYPERTENSION: Primary | ICD-10-CM

## 2018-03-29 PROCEDURE — 3074F SYST BP LT 130 MM HG: CPT | Performed by: FAMILY MEDICINE

## 2018-03-29 PROCEDURE — 1036F TOBACCO NON-USER: CPT | Performed by: FAMILY MEDICINE

## 2018-03-29 PROCEDURE — 99214 OFFICE O/P EST MOD 30 MIN: CPT | Performed by: FAMILY MEDICINE

## 2018-03-29 PROCEDURE — 3078F DIAST BP <80 MM HG: CPT | Performed by: FAMILY MEDICINE

## 2018-03-29 RX ORDER — LISINOPRIL 5 MG/1
5 TABLET ORAL DAILY
Qty: 90 TABLET | Refills: 1 | Status: SHIPPED | OUTPATIENT
Start: 2018-03-29 | End: 2018-06-27

## 2018-03-29 RX ORDER — ROSUVASTATIN CALCIUM 10 MG/1
1 TABLET, COATED ORAL DAILY
COMMUNITY
Start: 2017-12-06 | End: 2018-03-29 | Stop reason: ALTCHOICE

## 2018-03-29 RX ORDER — PREDNISONE 20 MG/1
40 TABLET ORAL DAILY
Refills: 0 | COMMUNITY
Start: 2018-01-09 | End: 2018-03-29 | Stop reason: ALTCHOICE

## 2018-03-29 RX ORDER — NITROGLYCERIN 0.4 MG/1
TABLET SUBLINGUAL
COMMUNITY
Start: 2017-12-06 | End: 2018-03-29 | Stop reason: ALTCHOICE

## 2018-03-29 RX ORDER — NITROGLYCERIN 0.4 MG/1
1 TABLET SUBLINGUAL
COMMUNITY
Start: 2017-12-06 | End: 2018-03-29 | Stop reason: ALTCHOICE

## 2018-03-29 NOTE — PATIENT INSTRUCTIONS
long discussion about the structural dangers and complications of a dilated aortic root and dilated  Ascending aortic aneurysm   we discussed the lack of his blood pressure pill   we know that he did not feel well on his amlodipine, but his catheterization was acceptable  And his coronary vasculature was fine  however he does have occasional spikes in his systolic blood pressure which are unacceptable   he needs to be on something and we discussed that so we are going to try a small dose of lisinopril to keep his blood pressure 110/80 or less and he agrees with this   currently,  With this visit he was on no medicine  we are going to set up a consult with Dr Anamika Miles

## 2018-03-29 NOTE — PROGRESS NOTES
Standard Visit   Assessment/Plan:     Visit Diagnosis:  Diagnoses and all orders for this visit:    Essential hypertension  -     lisinopril (ZESTRIL) 5 mg tablet; Take 1 tablet (5 mg total) by mouth daily for 90 days    Aneurysmal dilatation (HCC)    Tietzes syndrome    Concern about cardiovascular disease without diagnosis    Other orders  -     Discontinue: rosuvastatin (CRESTOR) 10 MG tablet; Take 1 tablet by mouth daily  -     Discontinue: nitroglycerin (NITROSTAT) 0 4 mg SL tablet; PLACE 1 TABLET UNDER THE TONGUE EVERY 5 MINUTES UP TO 3 DOSES AS NEEDED FOR CHEST PAIN  -     Discontinue: nitroglycerin (NITROSTAT) 0 4 mg SL tablet; Place 1 tablet under the tongue every 5 (five) minutes as needed  -     Discontinue: predniSONE 20 mg tablet; Take 40 mg by mouth daily     long discussion about the structural dangers and complications of a dilated aortic root and dilated  Ascending aortic aneurysm   we discussed the lack of his blood pressure pill   we know that he did not feel well on his amlodipine, but his catheterization was acceptable  And his coronary vasculature was fine  however he does have occasional spikes in his systolic blood pressure which are unacceptable   he needs to be on something and we discussed that so we are going to try a small dose of lisinopril to keep his blood pressure 110/80 or less and he agrees with this   currently,  With this visit he was on no medicine  we are going to set up a consult with Dr Maddie Fong  Subjective:    Patient ID: Omar Gilman is a 63 y o  male       Patient is here with the following concerns:    Here for f/u Intense cardiac workup for dilated aortic root and ascending aortic aneurysm   workup done through [facility] with Ed Meléndez and Milad Mena   his symptoms started 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   school nurse took his blood pressure which was very high   he was sweating, vagal, short of breath   she advised him to be seen in the emergency room   EKG and echo were done the echo revealed a dilated aortic root   this patient is a 81-year-old male who is in absolute top physical health who is a marathon runner   90% plant based diet,  Nonsmoker    [employer] of school [employer]            The following portions of the patient's history were reviewed and updated as appropriate: allergies, current medications, past family history, past medical history, past social history, past surgical history and problem list     Review of Systems   Constitutional: Positive for activity change and fatigue  Negative for diaphoresis, fever and unexpected weight change  HENT: Negative  Eyes: Negative  Respiratory: Positive for chest tightness  Negative for cough, shortness of breath and wheezing  Cardiovascular: Positive for chest pain and palpitations  Gastrointestinal: Negative  Endocrine: Negative  Genitourinary: Negative  Musculoskeletal: Negative  Skin: Negative  Neurological: Positive for light-headedness  Negative for dizziness, syncope, weakness and headaches  Psychiatric/Behavioral: The patient is nervous/anxious  /72   Pulse 60   Wt 83 5 kg (184 lb)   SpO2 99%   BMI 23 95 kg/m²   Social History     Social History    Marital status: /Civil Union     Spouse name: N/A    Number of children: N/A    Years of education: N/A     Occupational History    Not on file       Social History Main Topics    Smoking status: Former Smoker    Smokeless tobacco: Never Used    Alcohol use No    Drug use: No    Sexual activity: Not on file     Other Topics Concern    Not on file     Social History Narrative    No narrative on file     Past Medical History:   Diagnosis Date    Bradycardia with 41-50 beats per minute     Hypertension     Thoracic aortic aneurysm without rupture [facility])      Family History Problem Relation Age of Onset    Diabetes Mother      of other type with complications    Heart disease Mother     Coronary artery disease Mother      Premature CAD    Diabetes Father      of other type with complications    Heart disease Father     Coronary artery disease Father      Premature CAD    Sudden death Family      Past Surgical History:   Procedure Laterality Date    APPENDECTOMY      BACK SURGERY         Current Outpatient Prescriptions:     aspirin 81 mg chewable tablet, Chew 1 tablet daily, Disp: 30 tablet, Rfl: 0    lisinopril (ZESTRIL) 5 mg tablet, Take 1 tablet (5 mg total) by mouth daily for 90 days, Disp: 90 tablet, Rfl: 1      Objective:     Physical Exam   Constitutional: He is oriented to person, place, and time  He appears well-developed and well-nourished  HENT:   Head: Normocephalic and atraumatic  Eyes: Pupils are equal, round, and reactive to light  Neck: Normal range of motion  Neck supple  Cardiovascular: Normal rate and normal heart sounds  No murmur heard  Pulmonary/Chest: Effort normal and breath sounds normal  No respiratory distress  He exhibits no tenderness  Abdominal: Soft  Bowel sounds are normal    Musculoskeletal: Normal range of motion  He exhibits no tenderness  Lymphadenopathy:     He has no cervical adenopathy  Neurological: He is alert and oriented to person, place, and time  Coordination normal    Skin: Skin is warm and dry  Psychiatric: He has a normal mood and affect  Thought content normal    Nursing note and vitals reviewed  Social History     Social History    Marital status: /Civil Union     Spouse name: N/A    Number of children: N/A    Years of education: N/A     Occupational History    Not on file       Social History Main Topics    Smoking status: Former Smoker    Smokeless tobacco: Never Used    Alcohol use No    Drug use: No    Sexual activity: Not on file     Other Topics Concern    Not on file Social History Narrative    No narrative on file     Past Medical History:   Diagnosis Date    Bradycardia with 41-50 beats per minute     Hypertension     Thoracic aortic aneurysm without rupture (HCC)        Current Outpatient Prescriptions:     aspirin 81 mg chewable tablet, Chew 1 tablet daily, Disp: 30 tablet, Rfl: 0    lisinopril (ZESTRIL) 5 mg tablet, Take 1 tablet (5 mg total) by mouth daily for 90 days, Disp: 90 tablet, Rfl: 1  Family History   Problem Relation Age of Onset    Diabetes Mother      of other type with complications    Heart disease Mother     Coronary artery disease Mother      Premature CAD    Diabetes Father      of other type with complications    Heart disease Father     Coronary artery disease Father      Premature CAD    Sudden death Family        Patient Instructions   long discussion about the structural dangers and complications of a dilated aortic root and dilated  Ascending aortic aneurysm   we discussed the lack of his blood pressure pill   we know that he did not feel well on his amlodipine, but his catheterization was acceptable  And his coronary vasculature was fine  however he does have occasional spikes in his systolic blood pressure which are unacceptable   he needs to be on something and we discussed that so we are going to try a small dose of lisinopril to keep his blood pressure 110/80 or less and he agrees with this   currently,  With this visit he was on no medicine  we are going to set up a consult with Dr Katya Henderson  long discussion about the structural dangers and complications of a dilated aortic root and dilated  Ascending aortic aneurysm   we discussed the lack of his blood pressure pill   we know that he did not feel well on his amlodipine, but his catheterization was acceptable  And his coronary vasculature was fine     however he does have occasional spikes in his systolic blood pressure which are unacceptable   he needs to be on something and we discussed that so we are going to try a small dose of lisinopril to keep his blood pressure 110/80 or less and he agrees with this   currently,  With this visit he was on no medicine  we are going to set up a consult with OBED Reza

## 2019-01-23 ENCOUNTER — OFFICE VISIT (OUTPATIENT)
Dept: FAMILY MEDICINE CLINIC | Facility: CLINIC | Age: 59
End: 2019-01-23
Payer: COMMERCIAL

## 2019-01-23 VITALS
DIASTOLIC BLOOD PRESSURE: 78 MMHG | SYSTOLIC BLOOD PRESSURE: 118 MMHG | OXYGEN SATURATION: 89 % | HEART RATE: 52 BPM | WEIGHT: 176 LBS | BODY MASS INDEX: 22.91 KG/M2

## 2019-01-23 DIAGNOSIS — E78.2 MIXED HYPERLIPIDEMIA: ICD-10-CM

## 2019-01-23 DIAGNOSIS — I10 ESSENTIAL HYPERTENSION: ICD-10-CM

## 2019-01-23 DIAGNOSIS — I72.9 ANEURYSMAL DILATATION (HCC): ICD-10-CM

## 2019-01-23 DIAGNOSIS — Z11.59 ENCOUNTER FOR HEPATITIS C SCREENING TEST FOR LOW RISK PATIENT: ICD-10-CM

## 2019-01-23 DIAGNOSIS — R00.1 BRADYCARDIA: ICD-10-CM

## 2019-01-23 DIAGNOSIS — Z12.11 SCREENING FOR COLON CANCER: ICD-10-CM

## 2019-01-23 DIAGNOSIS — I71.2 THORACIC AORTIC ANEURYSM WITHOUT RUPTURE (HCC): Primary | ICD-10-CM

## 2019-01-23 PROCEDURE — 3074F SYST BP LT 130 MM HG: CPT | Performed by: FAMILY MEDICINE

## 2019-01-23 PROCEDURE — 1036F TOBACCO NON-USER: CPT | Performed by: FAMILY MEDICINE

## 2019-01-23 PROCEDURE — 3078F DIAST BP <80 MM HG: CPT | Performed by: FAMILY MEDICINE

## 2019-01-23 PROCEDURE — 99214 OFFICE O/P EST MOD 30 MIN: CPT | Performed by: FAMILY MEDICINE

## 2019-01-23 RX ORDER — LISINOPRIL 10 MG/1
10 TABLET ORAL DAILY
Qty: 90 TABLET | Refills: 1 | Status: SHIPPED | OUTPATIENT
Start: 2019-01-23

## 2019-01-23 NOTE — PROGRESS NOTES
Standard Visit   Assessment/Plan:     Visit Diagnosis:  Diagnoses and all orders for this visit:    Thoracic aortic aneurysm without rupture ([address] )    Bradycardia    Essential hypertension  -     Microalbumin / creatinine urine ratio  -     TSH, 3rd generation with Free T4 reflex; Future  -     Comprehensive metabolic panel; Future  -     CBC and differential; Future  -     lisinopril (ZESTRIL) 10 mg tablet; Take 1 tablet (10 mg total) by mouth daily    Aneurysmal dilatation ([address] )  -     US abdominal aorta screening aaa; Future    Mixed hyperlipidemia  -     Lipid panel; Future  -     CBC and differential; Future    Encounter for hepatitis C screening test for low risk patient  -     Hepatitis C antibody; Future    Screening for colon cancer  -     Occult Blood, Fecal Immunochemical; Future    Ultrasound to screen for abdominal aneurysm  Fasting lab work  Restart the lisinopril 10 mg  Take  blood pressure every day for 2 weeks and bring the numbers in next visit  Long discussion regarding blood pressure and the need for this to be pristine when we are dealing  with an ascending aortic aneurysm          Subjective:    Patient ID: Navya Salas is a 63 y o  male       Patient is here with the following concerns:    Patient is here follow-up the CAT scan for his ascending aortic aneurysm  November 2017 he was seen in [facility] Emergency Room with crushing chest pain  He was ruled out for MI but was found to have a ascending aortic aneurysm  He was admitted and had a full cardiac workup  He was seen by Dr Freda Vilchis and upon for follow-up CAT scan and measurements of the aneurysm was found  To be a minimal enlargement of the aortic root  He has had ultrasounds every 6 months since then   And is followed by Cardiology  Last CT scan was done December 2018 with measurement of 4 2 it has grown slightly since his previous CT scan  He is overdue for lab work        The following portions of the patient's history were reviewed and updated as appropriate: allergies, current medications, past family history, past medical history, past social history, past surgical history and problem list     Review of Systems   Constitutional: Negative for fatigue and unexpected weight change  Respiratory: Negative for cough  Cardiovascular: Positive for chest pain  Negative for palpitations and leg swelling  Does complain of episodic chest pain when his blood pressure is elevated   Neurological: Negative for syncope and light-headedness  /78   Pulse (!) 52   Wt 79 8 kg (176 lb)   SpO2 (!) 89%   BMI 22 91 kg/m²   Social History     Social History    Marital status: /Civil Union     Spouse name: N/A    Number of children: N/A    Years of education: N/A     Occupational History    Not on file       Social History Main Topics    Smoking status: Former Smoker    Smokeless tobacco: Never Used    Alcohol use No    Drug use: No    Sexual activity: Not on file     Other Topics Concern    Not on file     Social History Narrative    No narrative on file     Past Medical History:   Diagnosis Date    Bradycardia with 41-50 beats per minute     Hypertension     Thoracic aortic [address] without rupture ([facility] [address] )      Family History   Problem Relation Age of Onset    Diabetes Mother         of other type with complications    Heart disease Mother     Coronary artery disease Mother         Premature CAD    Diabetes Father         of other type with complications    Heart disease Father     Coronary artery disease Father         Premature CAD    Sudden death Family      Past Surgical History:   Procedure Laterality Date    APPENDECTOMY      BACK SURGERY         Current Outpatient Prescriptions:     aspirin 81 mg chewable tablet, Chew 1 tablet daily, Disp: 30 tablet, Rfl: 0    lisinopril (ZESTRIL) 10 mg tablet, Take 1 tablet (10 mg total) by mouth daily, Disp: 90 tablet, Rfl: 1    lisinopril (ZESTRIL) 5 mg tablet, Take 1 tablet (5 mg total) by mouth daily for 90 days, Disp: 90 tablet, Rfl: 1      Objective:     Physical Exam   Constitutional: He is oriented to person, place, and time  He appears well-developed and well-nourished  HENT:   Head: Normocephalic and atraumatic  Eyes: Pupils are equal, round, and reactive to light  Neck: Normal range of motion  Neck supple  Cardiovascular: Normal heart sounds  Bradycardia rate 52  Blood pressure by me 110/72   Pulmonary/Chest: Effort normal and breath sounds normal    Abdominal: He exhibits no mass  Neurological: He is alert and oriented to person, place, and time  Skin: Skin is warm and dry  Psychiatric: He has a normal mood and affect  Nursing note and vitals reviewed  Social History     Social History    Marital status: /Civil Union     Spouse name: N/A    Number of children: N/A    Years of education: N/A     Occupational History    Not on file       Social History Main Topics    Smoking status: Former Smoker    Smokeless tobacco: Never Used    Alcohol use No    Drug use: No    Sexual activity: Not on file     Other Topics Concern    Not on file     Social History Narrative    No narrative on file     Past Medical History:   Diagnosis Date    Bradycardia with 41-50 beats per minute     Hypertension     Thoracic aortic aneurysm without rupture (HCC)        Current Outpatient Prescriptions:     aspirin 81 mg chewable tablet, Chew 1 tablet daily, Disp: 30 tablet, Rfl: 0    lisinopril (ZESTRIL) 10 mg tablet, Take 1 tablet (10 mg total) by mouth daily, Disp: 90 tablet, Rfl: 1    lisinopril (ZESTRIL) 5 mg tablet, Take 1 tablet (5 mg total) by mouth daily for 90 days, Disp: 90 tablet, Rfl: 1  Family History   Problem Relation Age of Onset    Diabetes Mother         of other type with complications    Heart disease Mother     Coronary artery disease Mother         Premature CAD    Diabetes Father         of other type with complications    Heart disease Father     Coronary artery disease Father         Premature CAD    Sudden death Family        Patient Instructions   Ultrasound to screen for abdominal aneurysm  Fasting lab work  Restart the lisinopril 10 mg  Take  blood pressure every day for 2 weeks and bring the numbers in next visit  Long discussion regarding blood pressure and the need for this to be pristine when we are dealing  with an ascending aortic aneurysm            OBED Gatica

## 2019-01-23 NOTE — PATIENT INSTRUCTIONS
Ultrasound to screen for abdominal aneurysm  Fasting lab work  Restart the lisinopril 10 mg  Take  blood pressure every day for 2 weeks and bring the numbers in next visit  Long discussion regarding blood pressure and the need for this to be pristine when we are dealing  with an ascending aortic aneurysm